# Patient Record
Sex: FEMALE | Race: WHITE | NOT HISPANIC OR LATINO | Employment: UNEMPLOYED | URBAN - METROPOLITAN AREA
[De-identification: names, ages, dates, MRNs, and addresses within clinical notes are randomized per-mention and may not be internally consistent; named-entity substitution may affect disease eponyms.]

---

## 2019-01-19 ENCOUNTER — HOSPITAL ENCOUNTER (OUTPATIENT)
Facility: MEDICAL CENTER | Age: 39
End: 2019-01-19
Attending: OBSTETRICS & GYNECOLOGY | Admitting: OBSTETRICS & GYNECOLOGY
Payer: COMMERCIAL

## 2019-01-19 VITALS
TEMPERATURE: 98.9 F | HEART RATE: 126 BPM | WEIGHT: 240 LBS | OXYGEN SATURATION: 95 % | RESPIRATION RATE: 18 BRPM | DIASTOLIC BLOOD PRESSURE: 60 MMHG | SYSTOLIC BLOOD PRESSURE: 116 MMHG | BODY MASS INDEX: 37.67 KG/M2 | HEIGHT: 67 IN

## 2019-01-19 LAB
ALBUMIN SERPL BCP-MCNC: 3.2 G/DL (ref 3.2–4.9)
ALBUMIN/GLOB SERPL: 1.1 G/DL
ALP SERPL-CCNC: 60 U/L (ref 30–99)
ALT SERPL-CCNC: 15 U/L (ref 2–50)
ANION GAP SERPL CALC-SCNC: 12 MMOL/L (ref 0–11.9)
APPEARANCE UR: CLEAR
AST SERPL-CCNC: 15 U/L (ref 12–45)
BASOPHILS # BLD AUTO: 0.2 % (ref 0–1.8)
BASOPHILS # BLD: 0.03 K/UL (ref 0–0.12)
BILIRUB SERPL-MCNC: 0.4 MG/DL (ref 0.1–1.5)
BILIRUB UR QL STRIP.AUTO: NEGATIVE
BUN SERPL-MCNC: 10 MG/DL (ref 8–22)
CALCIUM SERPL-MCNC: 8.6 MG/DL (ref 8.5–10.5)
CHLORIDE SERPL-SCNC: 106 MMOL/L (ref 96–112)
CO2 SERPL-SCNC: 18 MMOL/L (ref 20–33)
COLOR UR: YELLOW
CREAT SERPL-MCNC: 0.58 MG/DL (ref 0.5–1.4)
EOSINOPHIL # BLD AUTO: 0.01 K/UL (ref 0–0.51)
EOSINOPHIL NFR BLD: 0.1 % (ref 0–6.9)
ERYTHROCYTE [DISTWIDTH] IN BLOOD BY AUTOMATED COUNT: 42.6 FL (ref 35.9–50)
FLUAV RNA SPEC QL NAA+PROBE: NEGATIVE
FLUBV RNA SPEC QL NAA+PROBE: NEGATIVE
GLOBULIN SER CALC-MCNC: 3 G/DL (ref 1.9–3.5)
GLUCOSE SERPL-MCNC: 138 MG/DL (ref 65–99)
GLUCOSE UR STRIP.AUTO-MCNC: NEGATIVE MG/DL
HCT VFR BLD AUTO: 35.8 % (ref 37–47)
HGB BLD-MCNC: 12.4 G/DL (ref 12–16)
IMM GRANULOCYTES # BLD AUTO: 0.46 K/UL (ref 0–0.11)
IMM GRANULOCYTES NFR BLD AUTO: 3.7 % (ref 0–0.9)
KETONES UR STRIP.AUTO-MCNC: >=160 MG/DL
LEUKOCYTE ESTERASE UR QL STRIP.AUTO: NEGATIVE
LIPASE SERPL-CCNC: 37 U/L (ref 11–82)
LYMPHOCYTES # BLD AUTO: 0.76 K/UL (ref 1–4.8)
LYMPHOCYTES NFR BLD: 6.1 % (ref 22–41)
MCH RBC QN AUTO: 29.7 PG (ref 27–33)
MCHC RBC AUTO-ENTMCNC: 34.6 G/DL (ref 33.6–35)
MCV RBC AUTO: 85.9 FL (ref 81.4–97.8)
MICRO URNS: NORMAL
MONOCYTES # BLD AUTO: 0.54 K/UL (ref 0–0.85)
MONOCYTES NFR BLD AUTO: 4.3 % (ref 0–13.4)
NEUTROPHILS # BLD AUTO: 10.76 K/UL (ref 2–7.15)
NEUTROPHILS NFR BLD: 85.6 % (ref 44–72)
NITRITE UR QL STRIP.AUTO: NEGATIVE
NRBC # BLD AUTO: 0 K/UL
NRBC BLD-RTO: 0 /100 WBC
PH UR STRIP.AUTO: 6 [PH]
PLATELET # BLD AUTO: 160 K/UL (ref 164–446)
PMV BLD AUTO: 9 FL (ref 9–12.9)
POTASSIUM SERPL-SCNC: 3.5 MMOL/L (ref 3.6–5.5)
PROT SERPL-MCNC: 6.2 G/DL (ref 6–8.2)
PROT UR QL STRIP: NEGATIVE MG/DL
RBC # BLD AUTO: 4.17 M/UL (ref 4.2–5.4)
RBC UR QL AUTO: NEGATIVE
SODIUM SERPL-SCNC: 136 MMOL/L (ref 135–145)
SP GR UR STRIP.AUTO: 1.02
UROBILINOGEN UR STRIP.AUTO-MCNC: 0.2 MG/DL
WBC # BLD AUTO: 12.6 K/UL (ref 4.8–10.8)

## 2019-01-19 PROCEDURE — 96374 THER/PROPH/DIAG INJ IV PUSH: CPT

## 2019-01-19 PROCEDURE — 81003 URINALYSIS AUTO W/O SCOPE: CPT

## 2019-01-19 PROCEDURE — 59025 FETAL NON-STRESS TEST: CPT

## 2019-01-19 PROCEDURE — 80053 COMPREHEN METABOLIC PANEL: CPT

## 2019-01-19 PROCEDURE — 85025 COMPLETE CBC W/AUTO DIFF WBC: CPT

## 2019-01-19 PROCEDURE — 87502 INFLUENZA DNA AMP PROBE: CPT

## 2019-01-19 PROCEDURE — 700111 HCHG RX REV CODE 636 W/ 250 OVERRIDE (IP): Performed by: OBSTETRICS & GYNECOLOGY

## 2019-01-19 PROCEDURE — 83690 ASSAY OF LIPASE: CPT

## 2019-01-19 PROCEDURE — 700105 HCHG RX REV CODE 258: Performed by: OBSTETRICS & GYNECOLOGY

## 2019-01-19 PROCEDURE — 36415 COLL VENOUS BLD VENIPUNCTURE: CPT

## 2019-01-19 RX ORDER — ONDANSETRON 2 MG/ML
4 INJECTION INTRAMUSCULAR; INTRAVENOUS EVERY 4 HOURS PRN
Status: DISCONTINUED | OUTPATIENT
Start: 2019-01-19 | End: 2019-01-19 | Stop reason: HOSPADM

## 2019-01-19 RX ORDER — SODIUM CHLORIDE, SODIUM LACTATE, POTASSIUM CHLORIDE, CALCIUM CHLORIDE 600; 310; 30; 20 MG/100ML; MG/100ML; MG/100ML; MG/100ML
INJECTION, SOLUTION INTRAVENOUS CONTINUOUS
Status: DISCONTINUED | OUTPATIENT
Start: 2019-01-19 | End: 2019-01-19 | Stop reason: HOSPADM

## 2019-01-19 RX ADMIN — SODIUM CHLORIDE, POTASSIUM CHLORIDE, SODIUM LACTATE AND CALCIUM CHLORIDE 1000 ML: 600; 310; 30; 20 INJECTION, SOLUTION INTRAVENOUS at 17:40

## 2019-01-19 RX ADMIN — ONDANSETRON 4 MG: 2 INJECTION INTRAMUSCULAR; INTRAVENOUS at 18:55

## 2019-01-19 RX ADMIN — SODIUM CHLORIDE, POTASSIUM CHLORIDE, SODIUM LACTATE AND CALCIUM CHLORIDE: 600; 310; 30; 20 INJECTION, SOLUTION INTRAVENOUS at 18:40

## 2019-01-20 NOTE — PROGRESS NOTES
1850 report from DANYA Gardner RN  1956 Dr Martin updated and orders received to discharge home  2007 patient off floor stable on feet

## 2019-01-20 NOTE — PROGRESS NOTES
1705 - Patient of Dr. Moore presents with c/o N/V/D. Turk Gestation - 29.2 weeks    Reports N/V/D since last night. Unable to keep food or sips of fluid down. Reports her son was sick this week with the same symptoms, lasting 24 hours.     Denies denies contractions - cramping off and on just before she has diarrhea, since this afternoon reports constant low abdominal ache. Denies/reports problems with Pregnancy, denies ROM or bleeding. Denies change to vision/edema/HA, Reports FM. FM/TOCO use discussed and placed. FOB at BS. POC discussed. Influenza swab and UA sent to the lab. Patient encouraged to call RN with all questions concerns needs prn.    1720 - Report to Dr. Martin regarding patient arrival/complaint/status. Order for IV hydration until patient passes a PO challenge, PRN zofran, CBC/CMP/Lipase. POC discussed with patient. Patient would like to hold off on the zofran to see how she feels after a bag or two of fluid.     1850 - Report to Yakov LI RN

## 2019-01-20 NOTE — PROGRESS NOTES
NST Review    Karin Briseno is a 39 yo  who presented to [facility] at 29w2d with complaints of nausea and diarrhea for 24 hours. She thought she was febrile at home. She reported abdominal cramping but secondary to peristalsis versus contractions. She denied bleeding or LOF. She reported good FM. She was afebrile but tachycardic on presentation. NST was performed and reviewed by myself with baseline of 150's, moderate variability and accelerations present. Tocometer initially demonstrated irritability that later resolved. CBC demonstrated mild leukocytosis. Chemistry panel and lipase were unremarkable. Urine demonstrated ketones. With IV hydration and IV zofran she reported cessation of her nausea and passed a PO challenge. Ultimately she was stable for discharge home and follow up in clinic with Dr Moore as previously prescribed. She declined prescriptions for discharge. Symptomatic relief measures were reviewed.    Renata Martin M.D.

## 2019-03-05 ENCOUNTER — HOSPITAL ENCOUNTER (OUTPATIENT)
Facility: MEDICAL CENTER | Age: 39
End: 2019-03-05
Attending: OBSTETRICS & GYNECOLOGY | Admitting: OBSTETRICS & GYNECOLOGY
Payer: COMMERCIAL

## 2019-03-05 VITALS
DIASTOLIC BLOOD PRESSURE: 75 MMHG | WEIGHT: 260 LBS | BODY MASS INDEX: 41.78 KG/M2 | SYSTOLIC BLOOD PRESSURE: 127 MMHG | HEART RATE: 100 BPM | HEIGHT: 66 IN | RESPIRATION RATE: 18 BRPM | TEMPERATURE: 97.8 F

## 2019-03-05 LAB
ALBUMIN SERPL BCP-MCNC: 3.2 G/DL (ref 3.2–4.9)
ALBUMIN/GLOB SERPL: 1 G/DL
ALP SERPL-CCNC: 70 U/L (ref 30–99)
ALT SERPL-CCNC: 10 U/L (ref 2–50)
ANION GAP SERPL CALC-SCNC: 11 MMOL/L (ref 0–11.9)
AST SERPL-CCNC: 12 U/L (ref 12–45)
BASOPHILS # BLD AUTO: 0.3 % (ref 0–1.8)
BASOPHILS # BLD: 0.04 K/UL (ref 0–0.12)
BILIRUB SERPL-MCNC: 0.2 MG/DL (ref 0.1–1.5)
BUN SERPL-MCNC: 9 MG/DL (ref 8–22)
CALCIUM SERPL-MCNC: 9.9 MG/DL (ref 8.5–10.5)
CHLORIDE SERPL-SCNC: 105 MMOL/L (ref 96–112)
CO2 SERPL-SCNC: 20 MMOL/L (ref 20–33)
CREAT SERPL-MCNC: 0.59 MG/DL (ref 0.5–1.4)
CREAT UR-MCNC: 125.5 MG/DL
EOSINOPHIL # BLD AUTO: 0.15 K/UL (ref 0–0.51)
EOSINOPHIL NFR BLD: 1.2 % (ref 0–6.9)
ERYTHROCYTE [DISTWIDTH] IN BLOOD BY AUTOMATED COUNT: 46.3 FL (ref 35.9–50)
GLOBULIN SER CALC-MCNC: 3.3 G/DL (ref 1.9–3.5)
GLUCOSE SERPL-MCNC: 104 MG/DL (ref 65–99)
HCT VFR BLD AUTO: 34 % (ref 37–47)
HGB BLD-MCNC: 11.4 G/DL (ref 12–16)
IMM GRANULOCYTES # BLD AUTO: 0.23 K/UL (ref 0–0.11)
IMM GRANULOCYTES NFR BLD AUTO: 1.8 % (ref 0–0.9)
LYMPHOCYTES # BLD AUTO: 2.66 K/UL (ref 1–4.8)
LYMPHOCYTES NFR BLD: 20.7 % (ref 22–41)
MCH RBC QN AUTO: 29.8 PG (ref 27–33)
MCHC RBC AUTO-ENTMCNC: 33.5 G/DL (ref 33.6–35)
MCV RBC AUTO: 89 FL (ref 81.4–97.8)
MONOCYTES # BLD AUTO: 0.89 K/UL (ref 0–0.85)
MONOCYTES NFR BLD AUTO: 6.9 % (ref 0–13.4)
NEUTROPHILS # BLD AUTO: 8.87 K/UL (ref 2–7.15)
NEUTROPHILS NFR BLD: 69.1 % (ref 44–72)
NRBC # BLD AUTO: 0 K/UL
NRBC BLD-RTO: 0 /100 WBC
PLATELET # BLD AUTO: 190 K/UL (ref 164–446)
PMV BLD AUTO: 9.4 FL (ref 9–12.9)
POTASSIUM SERPL-SCNC: 3.3 MMOL/L (ref 3.6–5.5)
PROT SERPL-MCNC: 6.5 G/DL (ref 6–8.2)
PROT UR-MCNC: 22 MG/DL (ref 0–15)
PROT/CREAT UR: 175 MG/G (ref 10–107)
RBC # BLD AUTO: 3.82 M/UL (ref 4.2–5.4)
SODIUM SERPL-SCNC: 136 MMOL/L (ref 135–145)
URATE SERPL-MCNC: 5.8 MG/DL (ref 1.9–8.2)
WBC # BLD AUTO: 12.8 K/UL (ref 4.8–10.8)

## 2019-03-05 PROCEDURE — 59025 FETAL NON-STRESS TEST: CPT

## 2019-03-05 PROCEDURE — 82570 ASSAY OF URINE CREATININE: CPT

## 2019-03-05 PROCEDURE — 84156 ASSAY OF PROTEIN URINE: CPT

## 2019-03-05 PROCEDURE — 84550 ASSAY OF BLOOD/URIC ACID: CPT

## 2019-03-05 PROCEDURE — 80053 COMPREHEN METABOLIC PANEL: CPT

## 2019-03-05 PROCEDURE — 85025 COMPLETE CBC W/AUTO DIFF WBC: CPT

## 2019-03-05 PROCEDURE — 36415 COLL VENOUS BLD VENIPUNCTURE: CPT

## 2019-03-06 NOTE — DISCHARGE INSTRUCTIONS
Preeclampsia and Eclampsia  Preeclampsia is a serious condition that develops only during pregnancy. It is also called toxemia of pregnancy. This condition causes high blood pressure along with other symptoms, such as swelling and headaches. These symptoms may develop as the condition gets worse. Preeclampsia may occur at 20 weeks of pregnancy or later.  Diagnosing and treating preeclampsia early is very important. If not treated early, it can cause serious problems for you and your baby. One problem it can lead to is eclampsia, which is a condition that causes muscle jerking or shaking (convulsions or seizures) in the mother. Delivering your baby is the best treatment for preeclampsia or eclampsia. Preeclampsia and eclampsia symptoms usually go away after your baby is born.  What are the causes?  The cause of preeclampsia is not known.  What increases the risk?  The following risk factors make you more likely to develop preeclampsia:  · Being pregnant for the first time.  · Having had preeclampsia during a past pregnancy.  · Having a family history of preeclampsia.  · Having high blood pressure.  · Being pregnant with twins or triplets.  · Being 35 or older.  · Being -American.  · Having kidney disease or diabetes.  · Having medical conditions such as lupus or blood diseases.  · Being very overweight (obese).  What are the signs or symptoms?  The earliest signs of preeclampsia are:  · High blood pressure.  · Increased protein in your urine. Your health care provider will check for this at every visit before you give birth (prenatal visit).  Other symptoms that may develop as the condition gets worse include:  · Severe headaches.  · Sudden weight gain.  · Swelling of the hands, face, legs, and feet.  · Nausea and vomiting.  · Vision problems, such as blurred or double vision.  · Numbness in the face, arms, legs, and feet.  · Urinating less than usual.  · Dizziness.  · Slurred speech.  · Abdominal pain,  especially upper abdominal pain.  · Convulsions or seizures.  Symptoms generally go away after giving birth.  How is this diagnosed?  There are no screening tests for preeclampsia. Your health care provider will ask you about symptoms and check for signs of preeclampsia during your prenatal visits. You may also have tests that include:  · Urine tests.  · Blood tests.  · Checking your blood pressure.  · Monitoring your baby’s heart rate.  · Ultrasound.  How is this treated?  You and your health care provider will determine the treatment approach that is best for you. Treatment may include:  · Having more frequent prenatal exams to check for signs of preeclampsia, if you have an increased risk for preeclampsia.  · Bed rest.  · Reducing how much salt (sodium) you eat.  · Medicine to lower your blood pressure.  · Staying in the hospital, if your condition is severe. There, treatment will focus on controlling your blood pressure and the amount of fluids in your body (fluid retention).  · You may need to take medicine (magnesium sulfate) to prevent seizures. This medicine may be given as an injection or through an IV tube.  · Delivering your baby early, if your condition gets worse. You may have your labor started with medicine (induced), or you may have a  delivery.  Follow these instructions at home:  Eating and drinking  · Drink enough fluid to keep your urine clear or pale yellow.  · Eat a healthy diet that is low in sodium. Do not add salt to your food. Check nutrition labels to see how much sodium a food or beverage contains.  · Avoid caffeine.  Lifestyle  · Do not use any products that contain nicotine or tobacco, such as cigarettes and e-cigarettes. If you need help quitting, ask your health care provider.  · Do not use alcohol or drugs.  · Avoid stress as much as possible. Rest and get plenty of sleep.  General instructions  · Take over-the-counter and prescription medicines only as told by your health  care provider.  · When lying down, lie on your side. This keeps pressure off of your baby.  · When sitting or lying down, raise (elevate) your feet. Try putting some pillows underneath your lower legs.  · Exercise regularly. Ask your health care provider what kinds of exercise are best for you.  · Keep all follow-up and prenatal visits as told by your health care provider. This is important.  How is this prevented?  To prevent preeclampsia or eclampsia from developing during another pregnancy:  · Get proper medical care during pregnancy. Your health care provider may be able to prevent preeclampsia or diagnose and treat it early.  · Your health care provider may have you take a low-dose aspirin or a calcium supplement during your next pregnancy.  · You may have tests of your blood pressure and kidney function after giving birth.  · Maintain a healthy weight. Ask your health care provider for help managing weight gain during pregnancy.  · Work with your health care provider to manage any long-term (chronic) health conditions you have, such as diabetes or kidney problems.  Contact a health care provider if:  · You gain more weight than expected.  · You have headaches.  · You have nausea or vomiting.  · You have abdominal pain.  · You feel dizzy or light-headed.  Get help right away if:  · You develop sudden or severe swelling anywhere in your body. This usually happens in the legs.  · You gain 5 lbs (2.3 kg) or more during one week.  · You have severe:  ¨ Abdominal pain.  ¨ Headaches.  ¨ Dizziness.  ¨ Vision problems.  ¨ Confusion.  ¨ Nausea or vomiting.  · You have a seizure.  · You have trouble moving any part of your body.  · You develop numbness in any part of your body.  · You have trouble speaking.  · You have any abnormal bleeding.  · You pass out.  This information is not intended to replace advice given to you by your health care provider. Make sure you discuss any questions you have with your health care  provider.  Document Released: 12/15/2001 Document Revised: 08/15/2017 Document Reviewed: 07/24/2017  [employer] Interactive Patient Education © 2017 [employer]  Pre-term Labor (<37 weeks):  Call your physician or return to the hospital if:  · You have painless regular contractions more than 4 in one hour.  · Your water breaks (remember time and color).  · You have menstrual-like cramps, a low dull backache or pressure in your pelvis or back.  · Your baby does not move enough to complete the daily kick count (10 movements in 2 hours).  · Your baby moves much less often than on the days before or you have not felt your baby move all day.  · Please review the MEDICATION LIST section of your AFTER VISIT SUMMARY document.  · Take your medication as prescribed

## 2019-03-06 NOTE — PROGRESS NOTES
165. Pt here from Dr. Moore's office for PIH workup. Pt denies HA, spots in her vision, or epigastric pain. Pt denies uc's, leaking and bleeding. Pt notes +FM. Urine obtained and sent to the lab.    . Report to Dr. Yoon, BP's and lab results given. Orders to dc pt home with  labor and preeclampsia precautions. Pt to keep her regular appointment.    . Pt given dc instructions with preeclampsia precautions. Labor precautions and kick counts. Pt states that she understands and has no questions at this time.

## 2019-03-06 NOTE — H&P
OBSTETRICAL TRIAGE VISIT NOTE    IDENTIFICATION:  This is a 38-year-old  patient who is at 35-5/7th weeks   today, patient of Dr. Moore.  She was sent over from the office for elevated   blood pressures.  The patient denied headaches, visual changes or epigastric   pain.  She denied leaking, bleeding or uterine contractions.  She reported   good fetal movement.  On evaluation here, her blood pressures 142/89, 142/86,   141/89, 137/86 and 127/75.    LABORATORY DATA:  Hemoglobin 11.4, hematocrit 34, platelet count 190,000.  CMP   was normal.  Uric acid was 5.8, spot protein creatinine ratio was 175.  She   had a reactive NST.    ASSESSMENT AND PLAN:  A 38-year-old  at 35 weeks and 5 days sent over   from the office for elevated blood pressures.  Labs and blood pressures here   were consistent with gestational hypertension.  She is asymptomatic.    PLAN:  She will be sent home and will follow up with Dr. Moore later this   week for blood pressure check.  PIH precautions were reviewed with the   patient, and she voiced her understanding when to return here for further   evaluation.       ____________________________________     MD JEFF Chavira / CLARISA    DD:  2019 20:17:22  DT:  2019 21:12:58    D#:  [id number]  Job#:  [id number]

## 2019-03-11 ENCOUNTER — HOSPITAL ENCOUNTER (OUTPATIENT)
Facility: MEDICAL CENTER | Age: 39
End: 2019-03-11
Attending: OBSTETRICS & GYNECOLOGY | Admitting: OBSTETRICS & GYNECOLOGY
Payer: COMMERCIAL

## 2019-03-11 VITALS — DIASTOLIC BLOOD PRESSURE: 78 MMHG | HEART RATE: 100 BPM | SYSTOLIC BLOOD PRESSURE: 145 MMHG

## 2019-03-11 LAB
ALBUMIN SERPL BCP-MCNC: 3.4 G/DL (ref 3.2–4.9)
ALBUMIN/GLOB SERPL: 1.2 G/DL
ALP SERPL-CCNC: 83 U/L (ref 30–99)
ALT SERPL-CCNC: 10 U/L (ref 2–50)
ANION GAP SERPL CALC-SCNC: 11 MMOL/L (ref 0–11.9)
AST SERPL-CCNC: 12 U/L (ref 12–45)
BASOPHILS # BLD AUTO: 0.3 % (ref 0–1.8)
BASOPHILS # BLD: 0.04 K/UL (ref 0–0.12)
BILIRUB SERPL-MCNC: 0.3 MG/DL (ref 0.1–1.5)
BUN SERPL-MCNC: 9 MG/DL (ref 8–22)
CALCIUM SERPL-MCNC: 10.8 MG/DL (ref 8.5–10.5)
CHLORIDE SERPL-SCNC: 103 MMOL/L (ref 96–112)
CO2 SERPL-SCNC: 22 MMOL/L (ref 20–33)
CREAT SERPL-MCNC: 0.62 MG/DL (ref 0.5–1.4)
CREAT UR-MCNC: 82.8 MG/DL
EOSINOPHIL # BLD AUTO: 0.16 K/UL (ref 0–0.51)
EOSINOPHIL NFR BLD: 1.2 % (ref 0–6.9)
ERYTHROCYTE [DISTWIDTH] IN BLOOD BY AUTOMATED COUNT: 47.1 FL (ref 35.9–50)
GLOBULIN SER CALC-MCNC: 2.9 G/DL (ref 1.9–3.5)
GLUCOSE SERPL-MCNC: 87 MG/DL (ref 65–99)
HCT VFR BLD AUTO: 35.3 % (ref 37–47)
HGB BLD-MCNC: 11.6 G/DL (ref 12–16)
IMM GRANULOCYTES # BLD AUTO: 0.16 K/UL (ref 0–0.11)
IMM GRANULOCYTES NFR BLD AUTO: 1.2 % (ref 0–0.9)
LDH SERPL L TO P-CCNC: 145 U/L (ref 107–266)
LYMPHOCYTES # BLD AUTO: 2.7 K/UL (ref 1–4.8)
LYMPHOCYTES NFR BLD: 21 % (ref 22–41)
MCH RBC QN AUTO: 29.7 PG (ref 27–33)
MCHC RBC AUTO-ENTMCNC: 32.9 G/DL (ref 33.6–35)
MCV RBC AUTO: 90.3 FL (ref 81.4–97.8)
MONOCYTES # BLD AUTO: 0.9 K/UL (ref 0–0.85)
MONOCYTES NFR BLD AUTO: 7 % (ref 0–13.4)
NEUTROPHILS # BLD AUTO: 8.92 K/UL (ref 2–7.15)
NEUTROPHILS NFR BLD: 69.3 % (ref 44–72)
NRBC # BLD AUTO: 0 K/UL
NRBC BLD-RTO: 0 /100 WBC
PLATELET # BLD AUTO: 202 K/UL (ref 164–446)
PMV BLD AUTO: 9.4 FL (ref 9–12.9)
POTASSIUM SERPL-SCNC: 3.7 MMOL/L (ref 3.6–5.5)
PROT SERPL-MCNC: 6.3 G/DL (ref 6–8.2)
PROT UR-MCNC: 11.8 MG/DL (ref 0–15)
PROT/CREAT UR: 143 MG/G (ref 10–107)
RBC # BLD AUTO: 3.91 M/UL (ref 4.2–5.4)
SODIUM SERPL-SCNC: 136 MMOL/L (ref 135–145)
URATE SERPL-MCNC: 5.7 MG/DL (ref 1.9–8.2)
WBC # BLD AUTO: 12.9 K/UL (ref 4.8–10.8)

## 2019-03-11 PROCEDURE — 80053 COMPREHEN METABOLIC PANEL: CPT

## 2019-03-11 PROCEDURE — 84156 ASSAY OF PROTEIN URINE: CPT

## 2019-03-11 PROCEDURE — 84550 ASSAY OF BLOOD/URIC ACID: CPT

## 2019-03-11 PROCEDURE — 85025 COMPLETE CBC W/AUTO DIFF WBC: CPT

## 2019-03-11 PROCEDURE — 59025 FETAL NON-STRESS TEST: CPT

## 2019-03-11 PROCEDURE — 82570 ASSAY OF URINE CREATININE: CPT

## 2019-03-11 PROCEDURE — 36415 COLL VENOUS BLD VENIPUNCTURE: CPT

## 2019-03-11 PROCEDURE — 83615 LACTATE (LD) (LDH) ENZYME: CPT

## 2019-03-11 RX ORDER — CITRIC ACID/SODIUM CITRATE 334-500MG
30 SOLUTION, ORAL ORAL EVERY 6 HOURS PRN
Status: CANCELLED | OUTPATIENT
Start: 2019-03-11

## 2019-03-11 RX ORDER — ONDANSETRON 2 MG/ML
4 INJECTION INTRAMUSCULAR; INTRAVENOUS EVERY 6 HOURS PRN
Status: CANCELLED | OUTPATIENT
Start: 2019-03-11

## 2019-03-11 RX ORDER — SODIUM CHLORIDE, SODIUM LACTATE, POTASSIUM CHLORIDE, CALCIUM CHLORIDE 600; 310; 30; 20 MG/100ML; MG/100ML; MG/100ML; MG/100ML
INJECTION, SOLUTION INTRAVENOUS CONTINUOUS
Status: CANCELLED | OUTPATIENT
Start: 2019-03-11 | End: 2019-03-12

## 2019-03-11 RX ORDER — DEXTROSE, SODIUM CHLORIDE, SODIUM LACTATE, POTASSIUM CHLORIDE, AND CALCIUM CHLORIDE 5; .6; .31; .03; .02 G/100ML; G/100ML; G/100ML; G/100ML; G/100ML
INJECTION, SOLUTION INTRAVENOUS CONTINUOUS
Status: CANCELLED | OUTPATIENT
Start: 2019-03-12

## 2019-03-11 RX ORDER — ONDANSETRON 4 MG/1
4 TABLET, ORALLY DISINTEGRATING ORAL EVERY 6 HOURS PRN
Status: CANCELLED | OUTPATIENT
Start: 2019-03-11

## 2019-03-11 NOTE — PROGRESS NOTES
EDC- 4/4, EGA- 36.4    1330- Pt arrived to L&D from Dr. Moore's office for PIH workup.  EFM/TOCO applied, serial BP's started.  Pt denies HA/vision changes/RUQ pain/abnormal swelling, UC's, LOF, or VB.  Reports +FM.  1545- Report to Dr. Pardo.  1600- Orders received to discharge pt home, IOL scheduled for 3/14 at 1000.  Orders for 24 hour urine.  Dr. Pardo at  to discuss discharge instructions, pt verbalizes understanding.  1610- Order received for SVE prior to discharge.  SVE- 2/thick/-2, anterior.  Discharge paper work signed, reviewed HTN and PTL precautions, kick counts reviewed.  Pt verbalizes understanding.  Supplies for 24 hour urine provided.  1615- Pt discharged home ambulatory in stable condition.

## 2019-03-11 NOTE — DISCHARGE INSTRUCTIONS
General Instructions:  · If you think you are in labor, time contractions (lying on your left side) from the beginning of one contraction to the beginning of the next contraction for at least one hour.  · Increase fluid intake: you should consume 10-12 8 oz glasses of non-caffeinated fluid per day.  · Report any pressure or burning on urination to your physician.  · Monitor fetal movement: If you notice an absence or decrease in fetal movement, drink a large glass of water and rest on your side.  If there is no increase in movement, call your physician or go to the hospital for further evaluation.  · Report any sudden, sharp abdominal pain.  · Report any bleeding.  Spotting or pinkish discharge is normal after vaginal exam.  You may also spot after sexual intercourse.    High Blood Pressure:  · Rest on your right or left side.  · Report any severe headaches, dizziness, blurred vision or spots before your eyes.  · Report excessive swelling of your hands, face or feet.  · Report epigastric pain (upper abdominal pain)      Other Instructions:  Please carefully review your entire AFTER VISIT SUMMARY document for all discharge instructions.

## 2019-03-12 ENCOUNTER — HOSPITAL ENCOUNTER (OUTPATIENT)
Facility: MEDICAL CENTER | Age: 39
End: 2019-03-12
Attending: OBSTETRICS & GYNECOLOGY
Payer: COMMERCIAL

## 2019-03-12 PROCEDURE — 84156 ASSAY OF PROTEIN URINE: CPT

## 2019-03-12 PROCEDURE — 81050 URINALYSIS VOLUME MEASURE: CPT

## 2019-03-12 NOTE — PROGRESS NOTES
Department of Obstetrics and Gynecology  Labor and Delivery Assessment Note    ID: 38 y.o. is a  with IUP at 36w4d     Primary Obstetrician: Queta Moore MD    Assessing Obstetrician: Joseph Pardo MD    CC: Sent from the office for elevated BP    HPI: Pt feeling well.  Was at her normally scheduled visit today.   The patient denies HA, visual changes, RUQ/epigastric pain.  Endorses good fetal movement.  Denies CTX, LOF, VB.  No other c/o.    ROS: 10 systems negative except as noted above.    O: Patient Vitals for the past 24 hrs:   BP Pulse   19 1555 145/78 100   19 1540 141/73 (!) 108   19 1522 138/79 96   19 1512 122/71 (!) 107   19 1502 144/74 97   19 1452 144/75 (!) 105   19 1443 137/71 100   19 1432 159/92 97   19 1422 153/90 96   19 1412 141/85 (!) 114   19 1403 139/79 99   19 1352 142/83 100   19 1342 133/83 (!) 114     Gen: NAD, AAO  Pulm: no distress  Abd: Gravid, soft, uterus NTTP, no rebound/guarding  Ext: WWP, 2+ DPP, 1+ edema  SVE: 2/thick/-2 per RN exam    FHT: 140/mod gurjit/+accels, -decels  [place]: irritability    CBC:   3/11/2019 14:14   WBC 12.9 (H)   RBC 3.91 (L)   Hemoglobin 11.6 (L)   Hematocrit 35.3 (L)   MCV 90.3   MCH 29.7   MCHC 32.9 (L)   RDW 47.1   Platelet Count 202     [place]:   3/11/2019 14:14   Sodium 136   Potassium 3.7   Chloride 103   Co2 22   Anion Gap 11.0   Glucose 87   Bun 9   Creatinine 0.62   GFR If  >60   GFR If Non African American >60   Calcium 10.8 (H)   AST(SGOT) 12   ALT(SGPT) 10   Alkaline Phosphatase 83   Total Bilirubin 0.3   Albumin 3.4   Total Protein 6.3   Globulin 2.9   A-G Ratio 1.2   Uric Acid 5.7   LDH Total 145      3/11/2019 14:03   Protein Creatinine Ratio 143 (H)       A/P: Karin Briseno is a 38 y.o.  at 36w4d.  Mild range HTN.  Reassuring, reactive NST read by me.  *Elevated BP: likely c/w GHTN.  No evidence for HELLP or PreE.  No sx.  Will set up  for IOL in 3d on Thurs with Dr. Moore.  Complete 24h UProt.    OK for outpt mgmt.  PreE/term precautions given.      Joseph Pardo M.D.

## 2019-03-13 LAB
PROT 24H UR-MCNC: 270 MG/24 HR (ref 30–150)
PROT 24H UR-MRATE: 10 MG/DL (ref 0–15)
SPECIMEN VOL UR: 2700 ML

## 2019-03-14 ENCOUNTER — ANESTHESIA (OUTPATIENT)
Dept: OBGYN | Facility: MEDICAL CENTER | Age: 39
End: 2019-03-14
Payer: COMMERCIAL

## 2019-03-14 ENCOUNTER — ANESTHESIA EVENT (OUTPATIENT)
Dept: OBGYN | Facility: MEDICAL CENTER | Age: 39
End: 2019-03-14
Payer: COMMERCIAL

## 2019-03-14 ENCOUNTER — HOSPITAL ENCOUNTER (INPATIENT)
Facility: MEDICAL CENTER | Age: 39
LOS: 2 days | End: 2019-03-16
Attending: OBSTETRICS & GYNECOLOGY | Admitting: OBSTETRICS & GYNECOLOGY
Payer: COMMERCIAL

## 2019-03-14 ENCOUNTER — APPOINTMENT (OUTPATIENT)
Dept: OBGYN | Facility: MEDICAL CENTER | Age: 39
End: 2019-03-14
Attending: OBSTETRICS & GYNECOLOGY
Payer: COMMERCIAL

## 2019-03-14 DIAGNOSIS — R52 PAIN RELATED TO VAGINAL DELIVERY: ICD-10-CM

## 2019-03-14 LAB
ALBUMIN SERPL BCP-MCNC: 3.3 G/DL (ref 3.2–4.9)
ALBUMIN/GLOB SERPL: 1 G/DL
ALP SERPL-CCNC: 84 U/L (ref 30–99)
ALT SERPL-CCNC: 10 U/L (ref 2–50)
ANION GAP SERPL CALC-SCNC: 11 MMOL/L (ref 0–11.9)
AST SERPL-CCNC: 10 U/L (ref 12–45)
BASOPHILS # BLD AUTO: 0.2 % (ref 0–1.8)
BASOPHILS # BLD: 0.02 K/UL (ref 0–0.12)
BILIRUB SERPL-MCNC: 0.3 MG/DL (ref 0.1–1.5)
BUN SERPL-MCNC: 10 MG/DL (ref 8–22)
CALCIUM SERPL-MCNC: 9.9 MG/DL (ref 8.5–10.5)
CHLORIDE SERPL-SCNC: 104 MMOL/L (ref 96–112)
CO2 SERPL-SCNC: 20 MMOL/L (ref 20–33)
CREAT SERPL-MCNC: 0.7 MG/DL (ref 0.5–1.4)
EOSINOPHIL # BLD AUTO: 0.13 K/UL (ref 0–0.51)
EOSINOPHIL NFR BLD: 1.1 % (ref 0–6.9)
ERYTHROCYTE [DISTWIDTH] IN BLOOD BY AUTOMATED COUNT: 46.2 FL (ref 35.9–50)
GLOBULIN SER CALC-MCNC: 3.3 G/DL (ref 1.9–3.5)
GLUCOSE SERPL-MCNC: 164 MG/DL (ref 65–99)
HCT VFR BLD AUTO: 34.7 % (ref 37–47)
HGB BLD-MCNC: 11.8 G/DL (ref 12–16)
HOLDING TUBE BB 8507: NORMAL
IMM GRANULOCYTES # BLD AUTO: 0.16 K/UL (ref 0–0.11)
IMM GRANULOCYTES NFR BLD AUTO: 1.3 % (ref 0–0.9)
LYMPHOCYTES # BLD AUTO: 2.64 K/UL (ref 1–4.8)
LYMPHOCYTES NFR BLD: 21.6 % (ref 22–41)
MCH RBC QN AUTO: 29.9 PG (ref 27–33)
MCHC RBC AUTO-ENTMCNC: 34 G/DL (ref 33.6–35)
MCV RBC AUTO: 87.8 FL (ref 81.4–97.8)
MONOCYTES # BLD AUTO: 0.62 K/UL (ref 0–0.85)
MONOCYTES NFR BLD AUTO: 5.1 % (ref 0–13.4)
NEUTROPHILS # BLD AUTO: 8.65 K/UL (ref 2–7.15)
NEUTROPHILS NFR BLD: 70.7 % (ref 44–72)
NRBC # BLD AUTO: 0 K/UL
NRBC BLD-RTO: 0 /100 WBC
PLATELET # BLD AUTO: 200 K/UL (ref 164–446)
PMV BLD AUTO: 9.8 FL (ref 9–12.9)
POTASSIUM SERPL-SCNC: 3.7 MMOL/L (ref 3.6–5.5)
PROT SERPL-MCNC: 6.6 G/DL (ref 6–8.2)
RBC # BLD AUTO: 3.95 M/UL (ref 4.2–5.4)
SODIUM SERPL-SCNC: 135 MMOL/L (ref 135–145)
URATE SERPL-MCNC: 5.5 MG/DL (ref 1.9–8.2)
WBC # BLD AUTO: 12.2 K/UL (ref 4.8–10.8)

## 2019-03-14 PROCEDURE — 700105 HCHG RX REV CODE 258: Performed by: OBSTETRICS & GYNECOLOGY

## 2019-03-14 PROCEDURE — 700111 HCHG RX REV CODE 636 W/ 250 OVERRIDE (IP): Performed by: OBSTETRICS & GYNECOLOGY

## 2019-03-14 PROCEDURE — 59409 OBSTETRICAL CARE: CPT

## 2019-03-14 PROCEDURE — 700111 HCHG RX REV CODE 636 W/ 250 OVERRIDE (IP)

## 2019-03-14 PROCEDURE — 304965 HCHG RECOVERY SERVICES

## 2019-03-14 PROCEDURE — 36415 COLL VENOUS BLD VENIPUNCTURE: CPT

## 2019-03-14 PROCEDURE — 0HQ9XZZ REPAIR PERINEUM SKIN, EXTERNAL APPROACH: ICD-10-PCS | Performed by: OBSTETRICS & GYNECOLOGY

## 2019-03-14 PROCEDURE — 80053 COMPREHEN METABOLIC PANEL: CPT

## 2019-03-14 PROCEDURE — 84550 ASSAY OF BLOOD/URIC ACID: CPT

## 2019-03-14 PROCEDURE — 700101 HCHG RX REV CODE 250: Performed by: ANESTHESIOLOGY

## 2019-03-14 PROCEDURE — 700102 HCHG RX REV CODE 250 W/ 637 OVERRIDE(OP): Performed by: OBSTETRICS & GYNECOLOGY

## 2019-03-14 PROCEDURE — 700111 HCHG RX REV CODE 636 W/ 250 OVERRIDE (IP): Performed by: ANESTHESIOLOGY

## 2019-03-14 PROCEDURE — 770002 HCHG ROOM/CARE - OB PRIVATE (112)

## 2019-03-14 PROCEDURE — 85025 COMPLETE CBC W/AUTO DIFF WBC: CPT

## 2019-03-14 PROCEDURE — A9270 NON-COVERED ITEM OR SERVICE: HCPCS | Performed by: OBSTETRICS & GYNECOLOGY

## 2019-03-14 RX ORDER — ONDANSETRON 2 MG/ML
4 INJECTION INTRAMUSCULAR; INTRAVENOUS EVERY 6 HOURS PRN
Status: DISCONTINUED | OUTPATIENT
Start: 2019-03-14 | End: 2019-03-16 | Stop reason: HOSPADM

## 2019-03-14 RX ORDER — LABETALOL HYDROCHLORIDE 5 MG/ML
20-40 INJECTION, SOLUTION INTRAVENOUS PRN
Status: DISCONTINUED | OUTPATIENT
Start: 2019-03-14 | End: 2019-03-15

## 2019-03-14 RX ORDER — SODIUM CHLORIDE, SODIUM LACTATE, POTASSIUM CHLORIDE, AND CALCIUM CHLORIDE .6; .31; .03; .02 G/100ML; G/100ML; G/100ML; G/100ML
250 INJECTION, SOLUTION INTRAVENOUS PRN
Status: CANCELLED | OUTPATIENT
Start: 2019-03-14

## 2019-03-14 RX ORDER — ROPIVACAINE HYDROCHLORIDE 2 MG/ML
INJECTION, SOLUTION EPIDURAL; INFILTRATION PRN
Status: DISCONTINUED | OUTPATIENT
Start: 2019-03-14 | End: 2019-03-14 | Stop reason: SURG

## 2019-03-14 RX ORDER — DOCUSATE SODIUM 100 MG/1
100 CAPSULE, LIQUID FILLED ORAL 2 TIMES DAILY
COMMUNITY
End: 2021-01-14

## 2019-03-14 RX ORDER — OXYCODONE AND ACETAMINOPHEN 10; 325 MG/1; MG/1
1 TABLET ORAL EVERY 4 HOURS PRN
Status: DISCONTINUED | OUTPATIENT
Start: 2019-03-14 | End: 2019-03-16 | Stop reason: HOSPADM

## 2019-03-14 RX ORDER — OXYCODONE HYDROCHLORIDE AND ACETAMINOPHEN 5; 325 MG/1; MG/1
1 TABLET ORAL EVERY 4 HOURS PRN
Status: DISCONTINUED | OUTPATIENT
Start: 2019-03-14 | End: 2019-03-16 | Stop reason: HOSPADM

## 2019-03-14 RX ORDER — ROPIVACAINE HYDROCHLORIDE 2 MG/ML
INJECTION, SOLUTION EPIDURAL; INFILTRATION; PERINEURAL CONTINUOUS
Status: CANCELLED | OUTPATIENT
Start: 2019-03-14

## 2019-03-14 RX ORDER — SODIUM CHLORIDE, SODIUM LACTATE, POTASSIUM CHLORIDE, CALCIUM CHLORIDE 600; 310; 30; 20 MG/100ML; MG/100ML; MG/100ML; MG/100ML
INJECTION, SOLUTION INTRAVENOUS CONTINUOUS
Status: DISPENSED | OUTPATIENT
Start: 2019-03-14 | End: 2019-03-14

## 2019-03-14 RX ORDER — SODIUM CHLORIDE, SODIUM LACTATE, POTASSIUM CHLORIDE, AND CALCIUM CHLORIDE .6; .31; .03; .02 G/100ML; G/100ML; G/100ML; G/100ML
1000 INJECTION, SOLUTION INTRAVENOUS
Status: CANCELLED | OUTPATIENT
Start: 2019-03-14

## 2019-03-14 RX ORDER — ROPIVACAINE HYDROCHLORIDE 2 MG/ML
INJECTION, SOLUTION EPIDURAL; INFILTRATION; PERINEURAL
Status: COMPLETED
Start: 2019-03-14 | End: 2019-03-14

## 2019-03-14 RX ORDER — CALCIUM CARBONATE 500 MG/1
500 TABLET, CHEWABLE ORAL EVERY 4 HOURS PRN
Status: DISCONTINUED | OUTPATIENT
Start: 2019-03-14 | End: 2019-03-16 | Stop reason: HOSPADM

## 2019-03-14 RX ORDER — IBUPROFEN 600 MG/1
600 TABLET ORAL EVERY 6 HOURS PRN
Status: DISCONTINUED | OUTPATIENT
Start: 2019-03-14 | End: 2019-03-16 | Stop reason: HOSPADM

## 2019-03-14 RX ORDER — LIDOCAINE HYDROCHLORIDE AND EPINEPHRINE 15; 5 MG/ML; UG/ML
INJECTION, SOLUTION EPIDURAL PRN
Status: DISCONTINUED | OUTPATIENT
Start: 2019-03-14 | End: 2019-03-14 | Stop reason: SURG

## 2019-03-14 RX ORDER — HYDRALAZINE HYDROCHLORIDE 20 MG/ML
5-10 INJECTION INTRAMUSCULAR; INTRAVENOUS PRN
Status: DISCONTINUED | OUTPATIENT
Start: 2019-03-14 | End: 2019-03-15

## 2019-03-14 RX ORDER — ONDANSETRON 4 MG/1
4 TABLET, ORALLY DISINTEGRATING ORAL EVERY 6 HOURS PRN
Status: DISCONTINUED | OUTPATIENT
Start: 2019-03-14 | End: 2019-03-16 | Stop reason: HOSPADM

## 2019-03-14 RX ORDER — MISOPROSTOL 200 UG/1
800 TABLET ORAL
Status: DISCONTINUED | OUTPATIENT
Start: 2019-03-14 | End: 2019-03-15 | Stop reason: HOSPADM

## 2019-03-14 RX ADMIN — IBUPROFEN 600 MG: 600 TABLET ORAL at 21:59

## 2019-03-14 RX ADMIN — ANTACID TABLETS 500 MG: 500 TABLET, CHEWABLE ORAL at 15:57

## 2019-03-14 RX ADMIN — ROPIVACAINE HYDROCHLORIDE 5 ML: 2 INJECTION, SOLUTION EPIDURAL; INFILTRATION at 18:03

## 2019-03-14 RX ADMIN — ROPIVACAINE HYDROCHLORIDE 6 ML: 2 INJECTION, SOLUTION EPIDURAL; INFILTRATION at 18:00

## 2019-03-14 RX ADMIN — Medication 125 ML/HR: at 21:30

## 2019-03-14 RX ADMIN — FENTANYL CITRATE 100 MCG: 50 INJECTION, SOLUTION INTRAMUSCULAR; INTRAVENOUS at 16:23

## 2019-03-14 RX ADMIN — LIDOCAINE HYDROCHLORIDE,EPINEPHRINE BITARTRATE 3 ML: 15; .005 INJECTION, SOLUTION EPIDURAL; INFILTRATION; INTRACAUDAL; PERINEURAL at 17:16

## 2019-03-14 RX ADMIN — SODIUM CHLORIDE, POTASSIUM CHLORIDE, SODIUM LACTATE AND CALCIUM CHLORIDE: 600; 310; 30; 20 INJECTION, SOLUTION INTRAVENOUS at 17:07

## 2019-03-14 RX ADMIN — HYDRALAZINE HYDROCHLORIDE 5 MG: 20 INJECTION INTRAMUSCULAR; INTRAVENOUS at 20:01

## 2019-03-14 RX ADMIN — Medication 2000 ML/HR: at 20:30

## 2019-03-14 RX ADMIN — HYDRALAZINE HYDROCHLORIDE 5 MG: 20 INJECTION INTRAMUSCULAR; INTRAVENOUS at 14:11

## 2019-03-14 RX ADMIN — OXYCODONE AND ACETAMINOPHEN 1 TABLET: 10; 325 TABLET ORAL at 21:59

## 2019-03-14 RX ADMIN — Medication 2 MILLI-UNITS/MIN: at 11:30

## 2019-03-14 RX ADMIN — ROPIVACAINE HYDROCHLORIDE 100 ML: 2 INJECTION, SOLUTION EPIDURAL; INFILTRATION at 17:08

## 2019-03-14 RX ADMIN — SODIUM CHLORIDE, POTASSIUM CHLORIDE, SODIUM LACTATE AND CALCIUM CHLORIDE: 600; 310; 30; 20 INJECTION, SOLUTION INTRAVENOUS at 11:29

## 2019-03-14 RX ADMIN — ANTACID TABLETS 500 MG: 500 TABLET, CHEWABLE ORAL at 12:00

## 2019-03-14 RX ADMIN — HYDRALAZINE HYDROCHLORIDE 10 MG: 20 INJECTION INTRAMUSCULAR; INTRAVENOUS at 20:15

## 2019-03-14 RX ADMIN — LIDOCAINE HYDROCHLORIDE,EPINEPHRINE BITARTRATE 2 ML: 15; .005 INJECTION, SOLUTION EPIDURAL; INFILTRATION; INTRACAUDAL; PERINEURAL at 17:20

## 2019-03-14 ASSESSMENT — PATIENT HEALTH QUESTIONNAIRE - PHQ9
2. FEELING DOWN, DEPRESSED, IRRITABLE, OR HOPELESS: NOT AT ALL
SUM OF ALL RESPONSES TO PHQ9 QUESTIONS 1 AND 2: 0
1. LITTLE INTEREST OR PLEASURE IN DOING THINGS: NOT AT ALL

## 2019-03-14 ASSESSMENT — LIFESTYLE VARIABLES
ALCOHOL_USE: NO
EVER_SMOKED: NEVER

## 2019-03-14 ASSESSMENT — COPD QUESTIONNAIRES
HAVE YOU SMOKED AT LEAST 100 CIGARETTES IN YOUR ENTIRE LIFE: NO/DON'T KNOW
DO YOU EVER COUGH UP ANY MUCUS OR PHLEGM?: NO/ONLY WITH OCCASIONAL COLDS OR INFECTIONS
IN THE PAST 12 MONTHS DO YOU DO LESS THAN YOU USED TO BECAUSE OF YOUR BREATHING PROBLEMS: DISAGREE/UNSURE
DURING THE PAST 4 WEEKS HOW MUCH DID YOU FEEL SHORT OF BREATH: NONE/LITTLE OF THE TIME
COPD SCREENING SCORE: 0

## 2019-03-14 NOTE — H&P
History and physical     March 14, 2019    Chief complaint:   ` Patient presents for induction for gestational hypertension    History of present illness: Patient is a 38-year-old female para 2 whose estimated date of confinement is April 4 based on her last menstrual period consistent with an 8-week ultrasound.  Patient has been followed for high blood pressure.  This developed last couple weeks and she presents for induction for gestational hypertension.    Medical history: History of hyperthyroidism that has resolved    Surgical history: None    Habits: Patient denies tobacco alcohol or drug use    Allergies: No known drug allergies    Medications: None    Obstetric history: Patient is Rh+, rubella immune, group B strep negative.  2 previous uncomplicated vaginal deliveries    Family history: NoncontributoryTo current problem    Physical exam  Vitals: 142/83  General: Patient is awake alert pleasant  Head: Atraumatic normocephalic  Abdomen gravid: Estimated fetal weight is 7/2-8 pounds  Pelvic: Cervix is 3 cm / 50% effaced/versus vertex.  Amniotic sac was ruptured fluid was clear  Extremities: Normal reflexes are 1-2+    Assessment:  1-37-week intrauterine pregnancy  2-gestational hypertension      Plan:  We recommended delivery because of gestational hypertension to prevent development of preeclampsia and severe preeclampsia.  Risk complications of conservative management versus induction have been discussed.

## 2019-03-14 NOTE — PROGRESS NOTES
EDC - 19 EGA - 37.0    1021 - Pt arrived to labor and delivery for IOL for gestational HTN. Pt placed in room 213. External monitors in place X2. Category I FHT at this time.  Pt reports good FM. No complaints of contractions, ROM or vaginal bleeding. Pt denies HA epigastric pain, blurry vision and swelling. FOB at bedside. POC discussed with pt and family members, all questions answered.   1035 IV started with 1 attempt, labs drawn.  1056 Admission procedures completed at this time.  1253 Dr. Moore at bedside, SVE 3/thick/-2 AROM clear fluid at this time, FSE placed.  1540 Dr. Moore at bedside, POC discussed, pt states she is feeling her UCs stronger at this time, denies need for pain management.  1557 SVE 4-5/70/-2 Heat packs given  1623 Fentanyl given at this time.  1705 - Dr. Morton at bedside. Time out called at 1705. Epidural placed at this time by Dr Morton. Test dose at 1715 - No reaction detected - VSS. Dermatome level of T8. Epidural infusion settings are : 12mL/hr continuous infusion, 5mL bolus every 15 minutes with a 30mL/hr dose limit.   1749 - Flynn placed and draining to gravity. SVE 5-6/100/-1. Turned to left side. Category 1 FHT tracing at this time. No complaints at this time.   1757 Dr. Morton at bedside, bolus given.  1830 Pt states she is comfortable at this time.   SVE Antlip/+1 Pt able to push past lip. Dr. Moore notified, orders to start pushing at this time.    Pt repositioned into stirrups, pt educated on proper pushing technique. RN continuously at bedside evaluating FHT and pushing progress.   Dr. Moore at bedside for delivery.    of viable female infant by Dr. Moore to maternal abdomen. Apgars 8/9     Placenta delivered intact.     Fundus firm, lochia Light   Report given to RODNEY Dukes RN

## 2019-03-14 NOTE — CARE PLAN
Problem: Pain  Goal: Alleviation of Pain or a reduction in pain to the patient's comfort goal  Outcome: PROGRESSING AS EXPECTED  Pain management options discussed, pt denies needs at this time.    Problem: Risk for Infection, Impaired Wound Healing  Goal: Remain free from signs and symptoms of infection  Outcome: PROGRESSING AS EXPECTED  No s/s of infection noted, pt remains afebrile

## 2019-03-15 LAB
EKG IMPRESSION: NORMAL
ERYTHROCYTE [DISTWIDTH] IN BLOOD BY AUTOMATED COUNT: 47.7 FL (ref 35.9–50)
HCT VFR BLD AUTO: 32.3 % (ref 37–47)
HGB BLD-MCNC: 10.5 G/DL (ref 12–16)
MCH RBC QN AUTO: 29.2 PG (ref 27–33)
MCHC RBC AUTO-ENTMCNC: 32.5 G/DL (ref 33.6–35)
MCV RBC AUTO: 89.7 FL (ref 81.4–97.8)
PLATELET # BLD AUTO: 166 K/UL (ref 164–446)
PMV BLD AUTO: 9.4 FL (ref 9–12.9)
RBC # BLD AUTO: 3.6 M/UL (ref 4.2–5.4)
TSH SERPL DL<=0.005 MIU/L-ACNC: 1.82 UIU/ML (ref 0.38–5.33)
WBC # BLD AUTO: 15 K/UL (ref 4.8–10.8)

## 2019-03-15 PROCEDURE — 303615 HCHG EPIDURAL/SPINAL ANESTHESIA FOR LABOR

## 2019-03-15 PROCEDURE — 93005 ELECTROCARDIOGRAM TRACING: CPT | Performed by: OBSTETRICS & GYNECOLOGY

## 2019-03-15 PROCEDURE — A9270 NON-COVERED ITEM OR SERVICE: HCPCS | Performed by: OBSTETRICS & GYNECOLOGY

## 2019-03-15 PROCEDURE — 770002 HCHG ROOM/CARE - OB PRIVATE (112)

## 2019-03-15 PROCEDURE — 700102 HCHG RX REV CODE 250 W/ 637 OVERRIDE(OP): Performed by: OBSTETRICS & GYNECOLOGY

## 2019-03-15 PROCEDURE — 36415 COLL VENOUS BLD VENIPUNCTURE: CPT

## 2019-03-15 PROCEDURE — 84443 ASSAY THYROID STIM HORMONE: CPT

## 2019-03-15 PROCEDURE — 93010 ELECTROCARDIOGRAM REPORT: CPT | Performed by: INTERNAL MEDICINE

## 2019-03-15 PROCEDURE — 85027 COMPLETE CBC AUTOMATED: CPT

## 2019-03-15 RX ORDER — MISOPROSTOL 200 UG/1
600 TABLET ORAL
Status: DISCONTINUED | OUTPATIENT
Start: 2019-03-15 | End: 2019-03-16 | Stop reason: HOSPADM

## 2019-03-15 RX ORDER — VITAMIN A ACETATE, BETA CAROTENE, ASCORBIC ACID, CHOLECALCIFEROL, .ALPHA.-TOCOPHEROL ACETATE, DL-, THIAMINE MONONITRATE, RIBOFLAVIN, NIACINAMIDE, PYRIDOXINE HYDROCHLORIDE, FOLIC ACID, CYANOCOBALAMIN, CALCIUM CARBONATE, FERROUS FUMARATE, ZINC OXIDE, CUPRIC OXIDE 3080; 12; 120; 400; 1; 1.84; 3; 20; 22; 920; 25; 200; 27; 10; 2 [IU]/1; UG/1; MG/1; [IU]/1; MG/1; MG/1; MG/1; MG/1; MG/1; [IU]/1; MG/1; MG/1; MG/1; MG/1; MG/1
1 TABLET, FILM COATED ORAL EVERY MORNING
Status: DISCONTINUED | OUTPATIENT
Start: 2019-03-15 | End: 2019-03-16 | Stop reason: HOSPADM

## 2019-03-15 RX ORDER — MISOPROSTOL 200 UG/1
800 TABLET ORAL
Status: DISCONTINUED | OUTPATIENT
Start: 2019-03-15 | End: 2019-03-16 | Stop reason: HOSPADM

## 2019-03-15 RX ORDER — IBUPROFEN 600 MG/1
600 TABLET ORAL EVERY 6 HOURS PRN
Status: DISCONTINUED | OUTPATIENT
Start: 2019-03-15 | End: 2019-03-15

## 2019-03-15 RX ORDER — ACETAMINOPHEN 325 MG/1
325 TABLET ORAL EVERY 4 HOURS PRN
Status: DISCONTINUED | OUTPATIENT
Start: 2019-03-15 | End: 2019-03-16 | Stop reason: HOSPADM

## 2019-03-15 RX ORDER — CARBOPROST TROMETHAMINE 250 UG/ML
250 INJECTION, SOLUTION INTRAMUSCULAR
Status: DISCONTINUED | OUTPATIENT
Start: 2019-03-15 | End: 2019-03-16 | Stop reason: HOSPADM

## 2019-03-15 RX ORDER — DOCUSATE SODIUM 100 MG/1
100 CAPSULE, LIQUID FILLED ORAL 2 TIMES DAILY PRN
Status: DISCONTINUED | OUTPATIENT
Start: 2019-03-15 | End: 2019-03-16 | Stop reason: HOSPADM

## 2019-03-15 RX ORDER — HYDROCODONE BITARTRATE AND ACETAMINOPHEN 10; 325 MG/1; MG/1
1 TABLET ORAL EVERY 4 HOURS PRN
Status: DISCONTINUED | OUTPATIENT
Start: 2019-03-15 | End: 2019-03-15

## 2019-03-15 RX ORDER — HYDROCODONE BITARTRATE AND ACETAMINOPHEN 5; 325 MG/1; MG/1
1 TABLET ORAL EVERY 4 HOURS PRN
Status: DISCONTINUED | OUTPATIENT
Start: 2019-03-15 | End: 2019-03-15

## 2019-03-15 RX ORDER — SODIUM CHLORIDE, SODIUM LACTATE, POTASSIUM CHLORIDE, CALCIUM CHLORIDE 600; 310; 30; 20 MG/100ML; MG/100ML; MG/100ML; MG/100ML
INJECTION, SOLUTION INTRAVENOUS PRN
Status: DISCONTINUED | OUTPATIENT
Start: 2019-03-15 | End: 2019-03-16 | Stop reason: HOSPADM

## 2019-03-15 RX ADMIN — IBUPROFEN 600 MG: 600 TABLET ORAL at 05:36

## 2019-03-15 RX ADMIN — IBUPROFEN 600 MG: 600 TABLET ORAL at 12:28

## 2019-03-15 RX ADMIN — Medication 1 TABLET: at 08:26

## 2019-03-15 RX ADMIN — OXYCODONE HYDROCHLORIDE AND ACETAMINOPHEN 1 TABLET: 5; 325 TABLET ORAL at 22:22

## 2019-03-15 ASSESSMENT — EDINBURGH POSTNATAL DEPRESSION SCALE (EPDS)
I HAVE FELT SAD OR MISERABLE: NO, NOT AT ALL
I HAVE BEEN ABLE TO LAUGH AND SEE THE FUNNY SIDE OF THINGS: AS MUCH AS I ALWAYS COULD
I HAVE BEEN ANXIOUS OR WORRIED FOR NO GOOD REASON: NO, NOT AT ALL
I HAVE BEEN SO UNHAPPY THAT I HAVE HAD DIFFICULTY SLEEPING: NOT VERY OFTEN
THE THOUGHT OF HARMING MYSELF HAS OCCURRED TO ME: NEVER
THINGS HAVE BEEN GETTING ON TOP OF ME: NO, MOST OF THE TIME I HAVE COPED QUITE WELL
I HAVE BLAMED MYSELF UNNECESSARILY WHEN THINGS WENT WRONG: YES, SOME OF THE TIME
I HAVE FELT SCARED OR PANICKY FOR NO GOOD REASON: NO, NOT AT ALL
I HAVE BEEN SO UNHAPPY THAT I HAVE BEEN CRYING: ONLY OCCASIONALLY
I HAVE LOOKED FORWARD WITH ENJOYMENT TO THINGS: AS MUCH AS I EVER DID

## 2019-03-15 NOTE — PROGRESS NOTES
1440- Dr. Martin notified of patient's elevated heart rate this shift.  Patient denied palpitations, chest pain, and shortness of breath.  Patient denied pain.  Telephone order received for TSH and EKG.  6989- Dr. Martin notified of patient's TSH and EKG results and vital signs done at 1400.  No new orders at this time.

## 2019-03-15 NOTE — L&D DELIVERY NOTE
Delivery note    2019    Delivering physician: Queta Moore MD      Diagnoses:   1-Ter m intrauterine pregnancy  2-delivery viable female Apgars 8/9  3-gestational hypertension    Procedure:    1-induction with vaginal delivery    Hospital course:    Patient is a 38-year-old female  3 para 2 who presented at 37 weeks for induction for gestational hypertension.  She was begun on Pitocin followed by rupture of amniotic sac.  Once in active labor she received an epidural and progressed to complete.  Patient pushed about 10-15 minutes.  Baby delivered an uncomplicated manner.  Baby was vigorous moving all extremities.  Cord was clamped and cut.  Baby was handed off to mom with nurse present.  Apgars were 8/9.  Placenta delivered spontaneously and intact.  Exam of uterus revealed no retained placenta.    Estimated blood loss was 150 cc.    First degree tear was repaired with 3-0 Vicryl suture.

## 2019-03-15 NOTE — ANESTHESIA PREPROCEDURE EVALUATION
Labor pain    Relevant Problems   No relevant active problems       Physical Exam    Airway   Mallampati: II  TM distance: >3 FB  Neck ROM: full       Cardiovascular - normal exam  Rhythm: regular  Rate: normal  (-) murmur     Dental - normal exam         Pulmonary - normal exam  Breath sounds clear to auscultation     Abdominal    Neurological - normal exam                 Anesthesia Plan    ASA 2       Plan - epidural   Neuraxial block will be labor analgesia              Pertinent diagnostic labs and testing reviewed    Informed Consent:    Anesthetic plan and risks discussed with patient.

## 2019-03-15 NOTE — PROGRESS NOTES
Patient admitted to room 331. Bedside report received from RODNEY Dukes RN. Infant bands verified. Assessment complete. Plan of care discussed. Patient will request pain medication as needed. Patient oriented to room and educated on call light and emergency light. Baby's clipboard reviewed and instructions given for filling it out. Call light within reach. Will continue to monitor.

## 2019-03-15 NOTE — CARE PLAN
Problem: Communication  Goal: The ability to communicate needs accurately and effectively will improve  Outcome: PROGRESSING AS EXPECTED  Reviewed plan of care with patient who verbalized understanding.    Problem: Altered physiologic condition related to immediate post-delivery state and potential for bleeding/hemorrhage  Goal: Patient physiologically stable as evidenced by normal lochia, palpable uterine involution and vital signs within normal limits  Outcome: PROGRESSING AS EXPECTED  Fundus firm.  Lochia scant to light.    Problem: Potential for postpartum infection related to presence of episiotomy/vaginal tear and/or uterine contamination  Goal: Patient will be absent from signs and symptoms of infection  Outcome: PROGRESSING AS EXPECTED  Patient is afebrile.

## 2019-03-15 NOTE — ANESTHESIA PROCEDURE NOTES
Epidural Block  Performed by: WENDY WELDON  Authorized by: WENDY WELDON     Patient Location:  OB  Start Time:  3/14/2019 5:09 PM  Reason for Block: labor analgesia    patient identified, IV checked, site marked, risks and benefits discussed, surgical consent, monitors and equipment checked, pre-op evaluation and timeout performed    Patient Position:  Sitting  Prep: Betadine, patient draped and sterile technique    Monitoring:  Blood pressure, continuous pulse oximetry and heart rate  Approach:  Midline  Location:  L3-L4  Injection Technique:  FRANSICO air  Needle Type:  Tuohy  Needle Gauge:  17 G  Needle Length:  3.5 in  Loss of resistance::  6  Catheter Size:  19 G  Catheter at Skin Depth:  10  Test Dose:  Lidocaine 1.5% with epinephrine 1-to-200,000  Test Dose Result:  Negative   First pass.  25 G pencil point to CSF.  2 ml 0.1% ropivicaine.

## 2019-03-15 NOTE — ANESTHESIA POSTPROCEDURE EVALUATION
Patient: Karin Briseno    Procedure Summary     Date:  03/14/19 Room / Location:      Anesthesia Start:  1708 Anesthesia Stop:  2027    Procedure:  Labor Epidural Diagnosis:      Scheduled Providers:   Responsible Provider:  Eamon Morton M.D.    Anesthesia Type:  neuraxial block ASA Status:  2          Final Anesthesia Type: neuraxial block  Last vitals  BP   Blood Pressure: 144/67 (03/14/19 2105)    Temp 37.6       Pulse   Pulse: (!) 122 (03/14/19 2105)   Resp        SpO2          Anesthesia Post Evaluation    Patient location during evaluation: PACU  Patient participation: complete - patient participated  Level of consciousness: awake and alert    Airway patency: patent  Anesthetic complications: no  Cardiovascular status: hemodynamically stable  Respiratory status: acceptable  Hydration status: euvolemic    PONV: none

## 2019-03-15 NOTE — ANESTHESIA TIME REPORT
Anesthesia Start and Stop Event Times     Date Time Event    3/14/2019 1708 Anesthesia Start     2027 Anesthesia Stop        Responsible Staff  03/14/19    Name Role Begin End    Eamon Morton M.D. Anesth 1708 2027        Post op Diagnosis  Distress from pain in labor    Premium Reason  A. 3PM - 7AM      Exception Times    Start Time             End Time                    Dr. Sierra Austin

## 2019-03-15 NOTE — PROGRESS NOTES
9224- Bedside report received from VICTORINO Huston.  Patient denied needs.  Patient assessment done.  Patient stated that she is voiding without difficulty and passing flatus.  Patient denied dizziness and stated that she is walking without difficulty.  Discussed pain management plan and patient prefers to call for pain medication as needed.  Reviewed plan of care.  Patient verbalized understanding.  FOB at bedside.  0810- Patient breastfeeding infant independently.

## 2019-03-15 NOTE — LACTATION NOTE
This note was copied from a baby's chart.  Baby 37 weeks, LPI, Baby 16 hours old, baby not on LPI plan at this time. Mother reports baby has been breastfeeding well, last feed breast fed both breasts 45 minutes total, observed deep latch, mother latching independently side lying. LC weighed baby at 16 hours of age, weight loss 2.6%, discussed with patients nurse will continue to monitor.     Reinforced hunger cues, breastfeed when baby shows cues or by 3 hours from last feed, importance of skin to skin, positioning baby at breast, cluster feeding & hand expression.     Breastfeeding POC:  Breastfeed on demand or by 3 hours from last feed, lots of skin to skin. F/U with LTC for outpatient lactation support.

## 2019-03-15 NOTE — PROGRESS NOTES
Progress Note        Subjective: Patient is doing well lochia is normal.    Objective: Blood pressure mildly to moderately elevated  General: Patient's awake alert pleasant  Head: Atraumatic normocephalic  Abdomen: Fundus is firm nontender    Assessment:  Postpartum day 1 vaginal delivery  Gestational HTN    Plan:    DC tomorrow  Routine care  Patient to f/u in one week for blood pressure check    Queta Moore MD

## 2019-03-15 NOTE — PROGRESS NOTES
2200 Report received from Chiquis GLEASON. POC discussed.  2255 Dr. Moore phoned regarding pt B/p. Orders received from Dr. Moore to repreat pt b/p x2 if pt systolic b/p greater than 160 and or diasolitc b/p greater than 105. If so then start pt on Lebetalol 100mg BID. Pt is to sty on L&d for a few hours for further evaluation  2330 Pt up to bathroom with steady shift. Pt voided, manasa care provided and gown changed.   0025 Pt into wheelchair. Personal belongings gathered by FOB. Pt transported to post partum in stable condition.   0030 Rpeort given to Merline GLEASON at bedside. POC discussed

## 2019-03-15 NOTE — CONSULTS
Lactation note:    Initial visit. MOB states that infant breast fed but may have not been an effective latch. She  her other 2 children for 1 year each. Discussed breastfeeding the LPI, and what to watch out for during feedings. Reviewed [facility] LPI handout.     Did discuss normal course of breastfeeding and what to expect at 12-24-48-72 hours. Encouraged frequent skin to skin, and to continue offering breast every 2-3 hours.      Observed MOB attempt to latch infant in sidelying, on the left breast. Infant is asleep, but would open mouth slightly, and latch onto the nipple only. She then would just do a couple of suckles, then go back to sleep again. Encouraged MOB to hand express for infant.     Discussed possibility of implementing feeding plan if infant continues to be sleepy for feedings, and not latching well. MOB verbalized understanding of feeding plan if needed. Discussed with Samira, Anisha.        MOB has no other questions or concerns regarding breastfeeding. Encouraged to call for assistance as needed.

## 2019-03-15 NOTE — LACTATION NOTE
This note was copied from a baby's chart.  Lactation note:    Initial visit. MOB states that infant breast fed but may have not been an effective latch. She  her other 2 children for 1 year each. Discussed breastfeeding the LPI, and what to watch out for during feedings. Reviewed [facility] LPI handout.     Did discuss normal course of breastfeeding and what to expect at 12-24-48-72 hours. Encouraged frequent skin to skin, and to continue offering breast every 2-3 hours.      Observed MOB attempt to latch infant in sidelying, on the left breast. Infant is asleep, but would open mouth slightly, and latch onto the nipple only. She then would just do a couple of suckles, then go back to sleep again. Encouraged MOB to hand express for infant.     Discussed possibility of implementing feeding plan if infant continues to be sleepy for feedings, and not latching well. MOB verbalized understanding of feeding plan if needed. Discussed with Samira, RNs.        MOB has no other questions or concerns regarding breastfeeding. Encouraged to call for assistance as needed.

## 2019-03-16 VITALS
HEART RATE: 96 BPM | SYSTOLIC BLOOD PRESSURE: 134 MMHG | HEIGHT: 66 IN | WEIGHT: 268 LBS | DIASTOLIC BLOOD PRESSURE: 76 MMHG | RESPIRATION RATE: 18 BRPM | TEMPERATURE: 97.4 F | OXYGEN SATURATION: 98 % | BODY MASS INDEX: 43.07 KG/M2

## 2019-03-16 PROBLEM — R52 PAIN RELATED TO VAGINAL DELIVERY: Status: ACTIVE | Noted: 2019-03-16

## 2019-03-16 PROCEDURE — A9270 NON-COVERED ITEM OR SERVICE: HCPCS | Performed by: OBSTETRICS & GYNECOLOGY

## 2019-03-16 PROCEDURE — 700102 HCHG RX REV CODE 250 W/ 637 OVERRIDE(OP): Performed by: OBSTETRICS & GYNECOLOGY

## 2019-03-16 PROCEDURE — 700112 HCHG RX REV CODE 229: Performed by: OBSTETRICS & GYNECOLOGY

## 2019-03-16 RX ORDER — IBUPROFEN 600 MG/1
600 TABLET ORAL EVERY 6 HOURS PRN
Qty: 30 TAB | Refills: 0 | Status: ON HOLD | OUTPATIENT
Start: 2019-03-16 | End: 2019-03-23

## 2019-03-16 RX ORDER — OXYCODONE HYDROCHLORIDE AND ACETAMINOPHEN 5; 325 MG/1; MG/1
1 TABLET ORAL EVERY 4 HOURS PRN
Qty: 8 TAB | Refills: 0 | Status: SHIPPED | OUTPATIENT
Start: 2019-03-16 | End: 2019-03-18

## 2019-03-16 RX ORDER — IBUPROFEN 600 MG/1
600 TABLET ORAL EVERY 6 HOURS PRN
Qty: 30 TAB | Refills: 0 | Status: SHIPPED | OUTPATIENT
Start: 2019-03-16 | End: 2019-03-16

## 2019-03-16 RX ADMIN — OXYCODONE HYDROCHLORIDE AND ACETAMINOPHEN 1 TABLET: 5; 325 TABLET ORAL at 15:51

## 2019-03-16 RX ADMIN — DOCUSATE SODIUM 100 MG: 100 CAPSULE, LIQUID FILLED ORAL at 05:36

## 2019-03-16 RX ADMIN — IBUPROFEN 600 MG: 600 TABLET ORAL at 05:36

## 2019-03-16 RX ADMIN — Medication 1 TABLET: at 05:36

## 2019-03-16 RX ADMIN — IBUPROFEN 600 MG: 600 TABLET ORAL at 12:02

## 2019-03-16 RX ADMIN — OXYCODONE AND ACETAMINOPHEN 1 TABLET: 10; 325 TABLET ORAL at 05:36

## 2019-03-16 NOTE — LACTATION NOTE
Follow up visit. Infant weight loss at 24 hours is 3.36%. MOB states that infant has been able to latch well, and does hear swallows/gulps when infant is latched. She also feels her milk is coming in.     Will have VICTORINO Yu continue to monitor feedings, and if they do change, or infant not able to latch, to have pumping and feeding plan implemented.     Encouraged to call for support as needed.

## 2019-03-16 NOTE — PROGRESS NOTES
Report received from VICTORINO Goel. Assessment completed, VSS. POC, pain management, safe sleep, and feeding frequency discussed, all questions answered. Will continue with post partum care.

## 2019-03-16 NOTE — CARE PLAN
Problem: Safety  Goal: Will remain free from injury  Outcome: PROGRESSING AS EXPECTED  Pt educated on fall safety, encouraged to keep clutter free and well lite environment.     Problem: Venous Thromboembolism (VTW)/Deep Vein Thrombosis (DVT) Prevention:  Goal: Patient will participate in Venous Thrombosis (VTE)/Deep Vein Thrombosis (DVT)Prevention Measures  Outcome: PROGRESSING AS EXPECTED  Pt educated on DVT risk, encouraged to ambulate often.

## 2019-03-16 NOTE — PROGRESS NOTES
2813- Bedside report received from VICTORINO Yu.  Patient denied needs.  8216- Patient assessment done.  Patient stated that she is voiding without difficulty and passing flatus.  Patient denied dizziness and stated that she is walking without difficulty.  Discussed pain management plan and patient prefers to be offered ibuprofen as it becomes available.  Patient will call for further pain intervention as needed.  Reviewed plan of care.  Patient verbalized understanding.  FOB at bedside.

## 2019-03-16 NOTE — LACTATION NOTE
This note was copied from a baby's chart.  Mother feels infant falls asleep quickly at breast during some feeds and is more active for others. Reviewed waking techniques, breast massage/compression/hand expression, and assisted with deeper latch. After achieving deep latch, mother feels infant has been latching shallowly with prior feeds, she does have small blisters and compression lines present on both nipples. Plan to observe full feed next time and determine if feeding plan necessary prior to discharge home.

## 2019-03-16 NOTE — PROGRESS NOTES
1550- Discharge instructions given to patient who verbalized understanding and stated she has no questions.  Rx's handed to patient after named verified.  Patient will call when ready for escort out to vehicle.  1705- Patient stated that she is ready for discharge.  Patient discharged to home, no change noted in condition, via wheelchair with infant and FOB.

## 2019-03-16 NOTE — DISCHARGE SUMMARY
"Discharge Summary    Admission Date: 3/14/19    Discharge Date: 3/16/19    Diagnosis:Active Problems:     (spontaneous vaginal delivery)    Subjective: Pain controlled. Normal lochia. Eating, voiding and ambulating without difficulty. Breast feeding. Denies HA, blurry vision or epigastric pain. Denies CP or SOB. Swelling LE but not significant. Denies orthopnea.     /78   Pulse 98   Temp 36.4 °C (97.5 °F) (Temporal)   Resp 16   Ht 1.676 m (5' 6\")   Wt 121.6 kg (268 lb)   SpO2 96%   Breastfeeding? Yes   BMI 43.26 kg/m²     GEN: NAD  CV: RRR, no murmurs  Pulm: lungs CTAB, no wheezes or crackles  GI:soft, NT, ND  :fundus firm and below umbilicus  EXT:no edema    Hospital Course: Karin Briseno is a 37 yo  who presented at 37w0d for induction of labor for gestational hypertension. She is s/p  of a viable female infant with APGARS 8/9. EBL 150cc. First degree laceration repaired. Post partum course was complicated by persistent tachycardia. EKG demonstrated sinus tachycardia. TSH within normal limits.Insignificant H/H drop. No evidence of infection. It trended towards improvement and she was ultimately stable and requesting discharge home on PPD#1.     Discharge Instructions   1. Diet : general  2. Activity: Pelvic rest for 6 weeks     Karin Briseno   Home Medication Instructions CAROLINA:[id number]    Printed on:19 0822   Medication Information                      docusate sodium (COLACE) 100 MG Cap  Take 100 mg by mouth 2 times a day.             ibuprofen (MOTRIN) 600 MG Tab  Take 1 Tab by mouth every 6 hours as needed (For cramping after delivery; do not give if patient is receiving ketorolac (Toradol)).             oxyCODONE-acetaminophen (PERCOCET) 5-325 MG Tab  Take 1 Tab by mouth every four hours as needed for up to 2 days.             Prenatal MV-Min-Fe Fum-FA-DHA (PRENATAL 1 PO)  Take  by mouth.             vitamin D (CHOLECALCIFEROL) 1000 UNIT Tab  Take 1,000 Units by mouth " every day.                   Follow up: 1 week for BP check    Complications:none    Renata Martin M.D.

## 2019-03-20 ENCOUNTER — HOSPITAL ENCOUNTER (EMERGENCY)
Facility: MEDICAL CENTER | Age: 39
End: 2019-03-20
Attending: EMERGENCY MEDICINE
Payer: COMMERCIAL

## 2019-03-20 ENCOUNTER — APPOINTMENT (OUTPATIENT)
Dept: RADIOLOGY | Facility: MEDICAL CENTER | Age: 39
End: 2019-03-20
Attending: EMERGENCY MEDICINE
Payer: COMMERCIAL

## 2019-03-20 ENCOUNTER — HOSPITAL ENCOUNTER (INPATIENT)
Facility: MEDICAL CENTER | Age: 39
LOS: 3 days | DRG: 776 | End: 2019-03-23
Attending: OBSTETRICS & GYNECOLOGY | Admitting: OBSTETRICS & GYNECOLOGY
Payer: COMMERCIAL

## 2019-03-20 VITALS
DIASTOLIC BLOOD PRESSURE: 111 MMHG | BODY MASS INDEX: 41.17 KG/M2 | OXYGEN SATURATION: 96 % | HEIGHT: 66 IN | TEMPERATURE: 96.8 F | WEIGHT: 256.17 LBS | RESPIRATION RATE: 18 BRPM | SYSTOLIC BLOOD PRESSURE: 194 MMHG | HEART RATE: 64 BPM

## 2019-03-20 LAB
ALBUMIN SERPL BCP-MCNC: 3.1 G/DL (ref 3.2–4.9)
ALBUMIN/GLOB SERPL: 0.9 G/DL
ALP SERPL-CCNC: 57 U/L (ref 30–99)
ALT SERPL-CCNC: 37 U/L (ref 2–50)
ANION GAP SERPL CALC-SCNC: 10 MMOL/L (ref 0–11.9)
AST SERPL-CCNC: 29 U/L (ref 12–45)
BASOPHILS # BLD AUTO: 0.4 % (ref 0–1.8)
BASOPHILS # BLD: 0.05 K/UL (ref 0–0.12)
BILIRUB SERPL-MCNC: 0.4 MG/DL (ref 0.1–1.5)
BUN SERPL-MCNC: 16 MG/DL (ref 8–22)
CALCIUM SERPL-MCNC: 9.6 MG/DL (ref 8.4–10.2)
CHLORIDE SERPL-SCNC: 105 MMOL/L (ref 96–112)
CO2 SERPL-SCNC: 23 MMOL/L (ref 20–33)
CREAT SERPL-MCNC: 0.82 MG/DL (ref 0.5–1.4)
EKG IMPRESSION: NORMAL
EOSINOPHIL # BLD AUTO: 0.33 K/UL (ref 0–0.51)
EOSINOPHIL NFR BLD: 2.7 % (ref 0–6.9)
ERYTHROCYTE [DISTWIDTH] IN BLOOD BY AUTOMATED COUNT: 45 FL (ref 35.9–50)
GLOBULIN SER CALC-MCNC: 3.6 G/DL (ref 1.9–3.5)
GLUCOSE SERPL-MCNC: 97 MG/DL (ref 65–99)
HCT VFR BLD AUTO: 35.6 % (ref 37–47)
HGB BLD-MCNC: 11.8 G/DL (ref 12–16)
IMM GRANULOCYTES # BLD AUTO: 0.21 K/UL (ref 0–0.11)
IMM GRANULOCYTES NFR BLD AUTO: 1.7 % (ref 0–0.9)
LYMPHOCYTES # BLD AUTO: 3.82 K/UL (ref 1–4.8)
LYMPHOCYTES NFR BLD: 31.8 % (ref 22–41)
MAGNESIUM SERPL-MCNC: 1.6 MG/DL (ref 1.5–2.5)
MAGNESIUM SERPL-MCNC: 2.9 MG/DL (ref 1.5–2.5)
MCH RBC QN AUTO: 28.9 PG (ref 27–33)
MCHC RBC AUTO-ENTMCNC: 33.1 G/DL (ref 33.6–35)
MCV RBC AUTO: 87.3 FL (ref 81.4–97.8)
MONOCYTES # BLD AUTO: 0.96 K/UL (ref 0–0.85)
MONOCYTES NFR BLD AUTO: 8 % (ref 0–13.4)
NEUTROPHILS # BLD AUTO: 6.66 K/UL (ref 2–7.15)
NEUTROPHILS NFR BLD: 55.4 % (ref 44–72)
NRBC # BLD AUTO: 0 K/UL
NRBC BLD-RTO: 0 /100 WBC
PLATELET # BLD AUTO: 265 K/UL (ref 164–446)
PMV BLD AUTO: 8.6 FL (ref 9–12.9)
POTASSIUM SERPL-SCNC: 3.6 MMOL/L (ref 3.6–5.5)
PROT SERPL-MCNC: 6.7 G/DL (ref 6–8.2)
RBC # BLD AUTO: 4.08 M/UL (ref 4.2–5.4)
SODIUM SERPL-SCNC: 138 MMOL/L (ref 135–145)
URATE SERPL-MCNC: 8 MG/DL (ref 1.9–8.2)
WBC # BLD AUTO: 12 K/UL (ref 4.8–10.8)

## 2019-03-20 PROCEDURE — 36415 COLL VENOUS BLD VENIPUNCTURE: CPT

## 2019-03-20 PROCEDURE — 770002 HCHG ROOM/CARE - OB PRIVATE (112)

## 2019-03-20 PROCEDURE — 85025 COMPLETE CBC W/AUTO DIFF WBC: CPT

## 2019-03-20 PROCEDURE — 700101 HCHG RX REV CODE 250: Performed by: OBSTETRICS & GYNECOLOGY

## 2019-03-20 PROCEDURE — 700111 HCHG RX REV CODE 636 W/ 250 OVERRIDE (IP): Performed by: EMERGENCY MEDICINE

## 2019-03-20 PROCEDURE — 700102 HCHG RX REV CODE 250 W/ 637 OVERRIDE(OP): Performed by: OBSTETRICS & GYNECOLOGY

## 2019-03-20 PROCEDURE — 700105 HCHG RX REV CODE 258

## 2019-03-20 PROCEDURE — 83735 ASSAY OF MAGNESIUM: CPT

## 2019-03-20 PROCEDURE — 83735 ASSAY OF MAGNESIUM: CPT | Mod: 91

## 2019-03-20 PROCEDURE — 96375 TX/PRO/DX INJ NEW DRUG ADDON: CPT

## 2019-03-20 PROCEDURE — 93005 ELECTROCARDIOGRAM TRACING: CPT | Performed by: EMERGENCY MEDICINE

## 2019-03-20 PROCEDURE — 700101 HCHG RX REV CODE 250: Performed by: EMERGENCY MEDICINE

## 2019-03-20 PROCEDURE — 80053 COMPREHEN METABOLIC PANEL: CPT

## 2019-03-20 PROCEDURE — 96374 THER/PROPH/DIAG INJ IV PUSH: CPT

## 2019-03-20 PROCEDURE — A9270 NON-COVERED ITEM OR SERVICE: HCPCS | Performed by: OBSTETRICS & GYNECOLOGY

## 2019-03-20 PROCEDURE — 99285 EMERGENCY DEPT VISIT HI MDM: CPT

## 2019-03-20 PROCEDURE — 302135 SEQUENTIAL COMPRESSION MACHINE: Performed by: OBSTETRICS & GYNECOLOGY

## 2019-03-20 PROCEDURE — 70450 CT HEAD/BRAIN W/O DYE: CPT

## 2019-03-20 PROCEDURE — 84550 ASSAY OF BLOOD/URIC ACID: CPT

## 2019-03-20 PROCEDURE — 700111 HCHG RX REV CODE 636 W/ 250 OVERRIDE (IP)

## 2019-03-20 RX ORDER — MAGNESIUM SULFATE HEPTAHYDRATE 40 MG/ML
4 INJECTION, SOLUTION INTRAVENOUS ONCE
Status: DISCONTINUED | OUTPATIENT
Start: 2019-03-20 | End: 2019-03-20 | Stop reason: HOSPADM

## 2019-03-20 RX ORDER — SODIUM CHLORIDE, SODIUM LACTATE, POTASSIUM CHLORIDE, CALCIUM CHLORIDE 600; 310; 30; 20 MG/100ML; MG/100ML; MG/100ML; MG/100ML
1000 INJECTION, SOLUTION INTRAVENOUS CONTINUOUS
Status: DISCONTINUED | OUTPATIENT
Start: 2019-03-20 | End: 2019-03-23 | Stop reason: HOSPADM

## 2019-03-20 RX ORDER — MAGNESIUM SULFATE HEPTAHYDRATE 40 MG/ML
INJECTION, SOLUTION INTRAVENOUS
Status: DISCONTINUED
Start: 2019-03-20 | End: 2019-03-20 | Stop reason: HOSPADM

## 2019-03-20 RX ORDER — LABETALOL HYDROCHLORIDE 5 MG/ML
10 INJECTION, SOLUTION INTRAVENOUS ONCE
Status: COMPLETED | OUTPATIENT
Start: 2019-03-20 | End: 2019-03-20

## 2019-03-20 RX ORDER — HYDRALAZINE HYDROCHLORIDE 20 MG/ML
10 INJECTION INTRAMUSCULAR; INTRAVENOUS ONCE
Status: COMPLETED | OUTPATIENT
Start: 2019-03-21 | End: 2019-03-20

## 2019-03-20 RX ORDER — MAGNESIUM SULFATE HEPTAHYDRATE 40 MG/ML
2 INJECTION, SOLUTION INTRAVENOUS CONTINUOUS
Status: DISCONTINUED | OUTPATIENT
Start: 2019-03-20 | End: 2019-03-22

## 2019-03-20 RX ORDER — MAGNESIUM SULFATE HEPTAHYDRATE 40 MG/ML
INJECTION, SOLUTION INTRAVENOUS
Status: COMPLETED
Start: 2019-03-20 | End: 2019-03-20

## 2019-03-20 RX ORDER — LABETALOL 100 MG/1
100 TABLET, FILM COATED ORAL 2 TIMES DAILY
Status: DISCONTINUED | OUTPATIENT
Start: 2019-03-20 | End: 2019-03-23 | Stop reason: HOSPADM

## 2019-03-20 RX ORDER — ACETAMINOPHEN 325 MG/1
650 TABLET ORAL EVERY 4 HOURS PRN
Status: DISCONTINUED | OUTPATIENT
Start: 2019-03-20 | End: 2019-03-23 | Stop reason: HOSPADM

## 2019-03-20 RX ORDER — HYDRALAZINE HYDROCHLORIDE 20 MG/ML
INJECTION INTRAMUSCULAR; INTRAVENOUS
Status: COMPLETED
Start: 2019-03-20 | End: 2019-03-20

## 2019-03-20 RX ORDER — LABETALOL HYDROCHLORIDE 5 MG/ML
20 INJECTION, SOLUTION INTRAVENOUS ONCE
Status: COMPLETED | OUTPATIENT
Start: 2019-03-20 | End: 2019-03-20

## 2019-03-20 RX ORDER — SODIUM CHLORIDE, SODIUM LACTATE, POTASSIUM CHLORIDE, CALCIUM CHLORIDE 600; 310; 30; 20 MG/100ML; MG/100ML; MG/100ML; MG/100ML
INJECTION, SOLUTION INTRAVENOUS
Status: COMPLETED
Start: 2019-03-20 | End: 2019-03-20

## 2019-03-20 RX ORDER — ONDANSETRON 2 MG/ML
4 INJECTION INTRAMUSCULAR; INTRAVENOUS ONCE
Status: COMPLETED | OUTPATIENT
Start: 2019-03-20 | End: 2019-03-20

## 2019-03-20 RX ORDER — MAGNESIUM SULFATE HEPTAHYDRATE 40 MG/ML
2 INJECTION, SOLUTION INTRAVENOUS ONCE
Status: COMPLETED | OUTPATIENT
Start: 2019-03-20 | End: 2019-03-20

## 2019-03-20 RX ADMIN — HYDRALAZINE HYDROCHLORIDE 10 MG: 20 INJECTION INTRAMUSCULAR; INTRAVENOUS at 23:58

## 2019-03-20 RX ADMIN — ONDANSETRON 4 MG: 2 INJECTION INTRAMUSCULAR; INTRAVENOUS at 20:29

## 2019-03-20 RX ADMIN — LABETALOL HYDROCHLORIDE 10 MG: 5 INJECTION, SOLUTION INTRAVENOUS at 21:15

## 2019-03-20 RX ADMIN — FENTANYL CITRATE 50 MCG: 50 INJECTION INTRAMUSCULAR; INTRAVENOUS at 20:27

## 2019-03-20 RX ADMIN — LABETALOL HYDROCHLORIDE 10 MG: 5 INJECTION INTRAVENOUS at 20:45

## 2019-03-20 RX ADMIN — SODIUM CHLORIDE, POTASSIUM CHLORIDE, SODIUM LACTATE AND CALCIUM CHLORIDE 1000 ML: 600; 310; 30; 20 INJECTION, SOLUTION INTRAVENOUS at 22:32

## 2019-03-20 RX ADMIN — MAGNESIUM SULFATE HEPTAHYDRATE 2 G/HR: 40 INJECTION, SOLUTION INTRAVENOUS at 22:31

## 2019-03-20 RX ADMIN — LABETALOL HYDROCHLORIDE 100 MG: 100 TABLET, FILM COATED ORAL at 22:36

## 2019-03-20 RX ADMIN — ACETAMINOPHEN 650 MG: 325 TABLET, FILM COATED ORAL at 22:36

## 2019-03-20 RX ADMIN — MAGNESIUM SULFATE HEPTAHYDRATE 2 G: 40 INJECTION, SOLUTION INTRAVENOUS at 20:37

## 2019-03-20 RX ADMIN — LABETALOL HYDROCHLORIDE 20 MG: 5 INJECTION INTRAVENOUS at 22:36

## 2019-03-20 ASSESSMENT — PATIENT HEALTH QUESTIONNAIRE - PHQ9
2. FEELING DOWN, DEPRESSED, IRRITABLE, OR HOPELESS: NOT AT ALL
1. LITTLE INTEREST OR PLEASURE IN DOING THINGS: NOT AT ALL
SUM OF ALL RESPONSES TO PHQ9 QUESTIONS 1 AND 2: 0

## 2019-03-21 ENCOUNTER — APPOINTMENT (OUTPATIENT)
Dept: RADIOLOGY | Facility: MEDICAL CENTER | Age: 39
DRG: 776 | End: 2019-03-21
Attending: OBSTETRICS & GYNECOLOGY
Payer: COMMERCIAL

## 2019-03-21 ENCOUNTER — APPOINTMENT (OUTPATIENT)
Dept: RADIOLOGY | Facility: MEDICAL CENTER | Age: 39
DRG: 776 | End: 2019-03-21
Attending: STUDENT IN AN ORGANIZED HEALTH CARE EDUCATION/TRAINING PROGRAM
Payer: COMMERCIAL

## 2019-03-21 LAB
ALBUMIN SERPL BCP-MCNC: 3.3 G/DL (ref 3.2–4.9)
ALBUMIN/GLOB SERPL: 1.3 G/DL
ALP SERPL-CCNC: 60 U/L (ref 30–99)
ALT SERPL-CCNC: 30 U/L (ref 2–50)
ANION GAP SERPL CALC-SCNC: 11 MMOL/L (ref 0–11.9)
AST SERPL-CCNC: 18 U/L (ref 12–45)
BASOPHILS # BLD AUTO: 0.3 % (ref 0–1.8)
BASOPHILS # BLD: 0.03 K/UL (ref 0–0.12)
BILIRUB SERPL-MCNC: 0.2 MG/DL (ref 0.1–1.5)
BUN SERPL-MCNC: 13 MG/DL (ref 8–22)
CALCIUM SERPL-MCNC: 8.5 MG/DL (ref 8.5–10.5)
CHLORIDE SERPL-SCNC: 101 MMOL/L (ref 96–112)
CO2 SERPL-SCNC: 23 MMOL/L (ref 20–33)
CREAT SERPL-MCNC: 0.73 MG/DL (ref 0.5–1.4)
EOSINOPHIL # BLD AUTO: 0.34 K/UL (ref 0–0.51)
EOSINOPHIL NFR BLD: 2.9 % (ref 0–6.9)
ERYTHROCYTE [DISTWIDTH] IN BLOOD BY AUTOMATED COUNT: 46.8 FL (ref 35.9–50)
GLOBULIN SER CALC-MCNC: 2.6 G/DL (ref 1.9–3.5)
GLUCOSE SERPL-MCNC: 116 MG/DL (ref 65–99)
HCT VFR BLD AUTO: 35 % (ref 37–47)
HGB BLD-MCNC: 11.6 G/DL (ref 12–16)
IMM GRANULOCYTES # BLD AUTO: 0.17 K/UL (ref 0–0.11)
IMM GRANULOCYTES NFR BLD AUTO: 1.5 % (ref 0–0.9)
LYMPHOCYTES # BLD AUTO: 2.71 K/UL (ref 1–4.8)
LYMPHOCYTES NFR BLD: 23.1 % (ref 22–41)
MAGNESIUM SERPL-MCNC: 4.6 MG/DL (ref 1.5–2.5)
MAGNESIUM SERPL-MCNC: 5.3 MG/DL (ref 1.5–2.5)
MAGNESIUM SERPL-MCNC: 5.8 MG/DL (ref 1.5–2.5)
MCH RBC QN AUTO: 29.7 PG (ref 27–33)
MCHC RBC AUTO-ENTMCNC: 33.1 G/DL (ref 33.6–35)
MCV RBC AUTO: 89.7 FL (ref 81.4–97.8)
MONOCYTES # BLD AUTO: 0.91 K/UL (ref 0–0.85)
MONOCYTES NFR BLD AUTO: 7.8 % (ref 0–13.4)
NEUTROPHILS # BLD AUTO: 7.56 K/UL (ref 2–7.15)
NEUTROPHILS NFR BLD: 64.4 % (ref 44–72)
NRBC # BLD AUTO: 0 K/UL
NRBC BLD-RTO: 0 /100 WBC
PLATELET # BLD AUTO: 266 K/UL (ref 164–446)
PMV BLD AUTO: 8.9 FL (ref 9–12.9)
POTASSIUM SERPL-SCNC: 3.7 MMOL/L (ref 3.6–5.5)
PROT SERPL-MCNC: 5.9 G/DL (ref 6–8.2)
RBC # BLD AUTO: 3.9 M/UL (ref 4.2–5.4)
SODIUM SERPL-SCNC: 135 MMOL/L (ref 135–145)
WBC # BLD AUTO: 11.7 K/UL (ref 4.8–10.8)

## 2019-03-21 PROCEDURE — 700111 HCHG RX REV CODE 636 W/ 250 OVERRIDE (IP): Performed by: OBSTETRICS & GYNECOLOGY

## 2019-03-21 PROCEDURE — 700105 HCHG RX REV CODE 258: Performed by: OBSTETRICS & GYNECOLOGY

## 2019-03-21 PROCEDURE — 83735 ASSAY OF MAGNESIUM: CPT

## 2019-03-21 PROCEDURE — 80053 COMPREHEN METABOLIC PANEL: CPT

## 2019-03-21 PROCEDURE — 700102 HCHG RX REV CODE 250 W/ 637 OVERRIDE(OP): Performed by: OBSTETRICS & GYNECOLOGY

## 2019-03-21 PROCEDURE — 36415 COLL VENOUS BLD VENIPUNCTURE: CPT

## 2019-03-21 PROCEDURE — 85025 COMPLETE CBC W/AUTO DIFF WBC: CPT

## 2019-03-21 PROCEDURE — 770002 HCHG ROOM/CARE - OB PRIVATE (112)

## 2019-03-21 PROCEDURE — A9270 NON-COVERED ITEM OR SERVICE: HCPCS | Performed by: OBSTETRICS & GYNECOLOGY

## 2019-03-21 PROCEDURE — 71275 CT ANGIOGRAPHY CHEST: CPT

## 2019-03-21 PROCEDURE — 700117 HCHG RX CONTRAST REV CODE 255: Performed by: OBSTETRICS & GYNECOLOGY

## 2019-03-21 PROCEDURE — 700101 HCHG RX REV CODE 250: Performed by: OBSTETRICS & GYNECOLOGY

## 2019-03-21 PROCEDURE — 302790 HCHG STAT ANTEPARTUM CARE, DAILY

## 2019-03-21 PROCEDURE — 99253 IP/OBS CNSLTJ NEW/EST LOW 45: CPT | Mod: GC | Performed by: HOSPITALIST

## 2019-03-21 PROCEDURE — 71046 X-RAY EXAM CHEST 2 VIEWS: CPT

## 2019-03-21 RX ORDER — HYDRALAZINE HYDROCHLORIDE 20 MG/ML
5-10 INJECTION INTRAMUSCULAR; INTRAVENOUS PRN
Status: DISCONTINUED | OUTPATIENT
Start: 2019-03-21 | End: 2019-03-23 | Stop reason: HOSPADM

## 2019-03-21 RX ORDER — MORPHINE SULFATE 10 MG/ML
5 INJECTION, SOLUTION INTRAMUSCULAR; INTRAVENOUS ONCE
Status: COMPLETED | OUTPATIENT
Start: 2019-03-21 | End: 2019-03-21

## 2019-03-21 RX ORDER — LABETALOL HYDROCHLORIDE 5 MG/ML
20-40 INJECTION, SOLUTION INTRAVENOUS PRN
Status: DISCONTINUED | OUTPATIENT
Start: 2019-03-21 | End: 2019-03-23 | Stop reason: HOSPADM

## 2019-03-21 RX ORDER — MAGNESIUM SULFATE HEPTAHYDRATE 40 MG/ML
2 INJECTION, SOLUTION INTRAVENOUS ONCE
Status: COMPLETED | OUTPATIENT
Start: 2019-03-21 | End: 2019-03-21

## 2019-03-21 RX ORDER — ONDANSETRON 2 MG/ML
4 INJECTION INTRAMUSCULAR; INTRAVENOUS EVERY 4 HOURS PRN
Status: DISCONTINUED | OUTPATIENT
Start: 2019-03-21 | End: 2019-03-23 | Stop reason: HOSPADM

## 2019-03-21 RX ORDER — OXYCODONE HYDROCHLORIDE AND ACETAMINOPHEN 5; 325 MG/1; MG/1
1-2 TABLET ORAL EVERY 4 HOURS PRN
Status: DISCONTINUED | OUTPATIENT
Start: 2019-03-21 | End: 2019-03-23 | Stop reason: HOSPADM

## 2019-03-21 RX ADMIN — LABETALOL HYDROCHLORIDE 100 MG: 100 TABLET, FILM COATED ORAL at 05:11

## 2019-03-21 RX ADMIN — LABETALOL HYDROCHLORIDE 100 MG: 100 TABLET, FILM COATED ORAL at 17:42

## 2019-03-21 RX ADMIN — IOHEXOL 75 ML: 350 INJECTION, SOLUTION INTRAVENOUS at 11:21

## 2019-03-21 RX ADMIN — OXYCODONE AND ACETAMINOPHEN 2 TABLET: 5; 325 TABLET ORAL at 22:06

## 2019-03-21 RX ADMIN — MORPHINE SULFATE 5 MG: 10 INJECTION INTRAVENOUS at 06:53

## 2019-03-21 RX ADMIN — ONDANSETRON 4 MG: 2 INJECTION INTRAMUSCULAR; INTRAVENOUS at 05:11

## 2019-03-21 RX ADMIN — OXYCODONE AND ACETAMINOPHEN 2 TABLET: 5; 325 TABLET ORAL at 05:11

## 2019-03-21 RX ADMIN — SODIUM CHLORIDE, POTASSIUM CHLORIDE, SODIUM LACTATE AND CALCIUM CHLORIDE 1000 ML: 600; 310; 30; 20 INJECTION, SOLUTION INTRAVENOUS at 10:29

## 2019-03-21 RX ADMIN — LABETALOL HYDROCHLORIDE 20 MG: 5 INJECTION INTRAVENOUS at 00:50

## 2019-03-21 RX ADMIN — MAGNESIUM SULFATE IN WATER 2 G: 40 INJECTION, SOLUTION INTRAVENOUS at 00:30

## 2019-03-21 RX ADMIN — MAGNESIUM SULFATE HEPTAHYDRATE 2 G/HR: 40 INJECTION, SOLUTION INTRAVENOUS at 15:54

## 2019-03-21 RX ADMIN — OXYCODONE AND ACETAMINOPHEN 2 TABLET: 5; 325 TABLET ORAL at 11:31

## 2019-03-21 ASSESSMENT — LIFESTYLE VARIABLES: EVER_SMOKED: NEVER

## 2019-03-21 ASSESSMENT — PATIENT HEALTH QUESTIONNAIRE - PHQ9
SUM OF ALL RESPONSES TO PHQ9 QUESTIONS 1 AND 2: 0
SUM OF ALL RESPONSES TO PHQ9 QUESTIONS 1 AND 2: 0
2. FEELING DOWN, DEPRESSED, IRRITABLE, OR HOPELESS: NOT AT ALL
2. FEELING DOWN, DEPRESSED, IRRITABLE, OR HOPELESS: NOT AT ALL
1. LITTLE INTEREST OR PLEASURE IN DOING THINGS: NOT AT ALL
1. LITTLE INTEREST OR PLEASURE IN DOING THINGS: NOT AT ALL

## 2019-03-21 NOTE — PROGRESS NOTES
"OB Progress Note    HA resolved with morphine. Denies blurry vision. Denies epigastric pain. Placed on O2 as her pulse ox was in the low 80's.     /75   Pulse 89   Temp (!) 35.3 °C (95.6 °F) (Temporal)   Resp 16   Ht 1.676 m (5' 6\")   Wt 116 kg (255 lb 11.7 oz)   SpO2 (!) 83%   Breastfeeding? Yes   BMI 41.28 kg/m²     Gen: NAD  Pulm: CTAB, no wheezes, rales or rhonchi  Abd: soft, non tender to palpation  Ext: no edema    Recent Labs      03/20/19   2124  03/21/19   0445   WBC  12.0*  11.7*   RBC  4.08*  3.90*   HEMOGLOBIN  11.8*  11.6*   HEMATOCRIT  35.6*  35.0*   MCV  87.3  89.7   MCH  28.9  29.7   RDW  45.0  46.8   PLATELETCT  265  266   MPV  8.6*  8.9*   NEUTSPOLYS  55.40  64.40   LYMPHOCYTES  31.80  23.10   MONOCYTES  8.00  7.80   EOSINOPHILS  2.70  2.90   BASOPHILS  0.40  0.30     Creatinine:0.76  AST/ALT:18/30    Assessment:   1. Post partum day #6  2. Preeclampsia with severe features  3. Abnormal pulse oximetry examination    Plan: CTPE to rule out pulmonary embolism vs pulm edema. Lung exam clinically normal. Continue magnesium for 24 total hours. BP currently normotensive to mild range on labetalol 100mg BID. Will continue to monitor closely.     Renata Martin M.D.      "

## 2019-03-21 NOTE — LACTATION NOTE
Mother reports that she is breastfeeding her  without difficulty or discomfort. Larger pump flanges provided, 30.5 mm.

## 2019-03-21 NOTE — ED TRIAGE NOTES
Pt bib family with c/o blood pressure problems. Pt states she had an induced labor on 3/14 at [facility] where her BP was noted to be elevated. Pt was discharged with the intent of a follow-up visit for BP control. Pt states that she developed a headache recently and during this time noted her BP to be high.

## 2019-03-21 NOTE — PROGRESS NOTES
OB Progress Note    Discussed negative CT PE results. Oxygen removed. After ~10 minutes, desaturation to 80's noted. Oxygen replaced. Will consult IM for evaluation of hypoxia.

## 2019-03-21 NOTE — ED PROVIDER NOTES
ED Provider Note    ER PROVIDER NOTE        CHIEF COMPLAINT  Chief Complaint   Patient presents with   • Blood Pressure Problem       HPI  Karin Briseno is a 38 y.o. female who presents to the emergency department complaining of headache and blood pressure problems.  Patient's had induced vaginal delivery on 314 due to gestational hypertension.  States she had been doing well, blood pressure is in 130s when released from the hospital.  This afternoon she noticed headache that has been gradually worsening through the evening, feels like a pounding in her ears and noted her blood pressure was quite high.  Headache is a pressure sensation, she denies any nausea or vomiting, does not seem to have any alleviating or aggravating factors.  She denies any visual symptoms focal weakness numbness or tingling.  She states she has some swelling in her feet but this seems to be the same as through her pregnancy.  She denies any abdominal pain.  She denies any chest pain or shortness of breath    REVIEW OF SYSTEMS  Pertinent positives include headache. Pertinent negatives include no abdominal pain. See HPI for details. All other systems reviewed and are negative.    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    SURGICAL HISTORY  patient denies any surgical history    FAMILY HISTORY  Family History   Problem Relation Age of Onset   • Cancer Father         Hx testicular   • Diabetes Father    • Heart Disease Father    • Hypertension Father    • Cancer Maternal Grandmother         MGGM Colon cancer 80s)       SOCIAL HISTORY  Social History     Social History   • Marital status:      Spouse name: N/A   • Number of children: 1   • Years of education: N/A     Occupational History   •  [employer]  "    Social History Main Topics   • Smoking status: Never Smoker   • Smokeless tobacco: Never Used   • Alcohol use No      Comment:  Rare   • Drug use: No   • Sexual activity: Yes     Partners: Male     Other Topics Concern   • Not on file     Social History Narrative    Mother of 2. Works for [employer]      History   Drug Use No       CURRENT MEDICATIONS  Home Medications     Reviewed by Peter Norwood R.N. (Registered Nurse) on 03/20/19 at 1931  Med List Status: Partial   Medication Last Dose Status   docusate sodium (COLACE) 100 MG Cap  Active   ibuprofen (MOTRIN) 600 MG Tab  Active   Prenatal MV-Min-Fe Fum-FA-DHA (PRENATAL 1 PO)  Active   vitamin D (CHOLECALCIFEROL) 1000 UNIT Tab  Active                ALLERGIES  Allergies   Allergen Reactions   • Codeine Vomiting       PHYSICAL EXAM  VITAL SIGNS: BP (!) 194/111   Pulse 64   Temp 36 °C (96.8 °F) (Temporal)   Resp 18   Ht 1.676 m (5' 6\")   Wt 116.2 kg (256 lb 2.8 oz)   SpO2 96%   BMI 41.35 kg/m²   Pulse ox interpretation: I interpret this pulse ox as normal.    Constitutional: Alert in no apparent distress.  HENT: No signs of trauma, Bilateral external ears normal, Nose normal.   Eyes: Pupils are equal and reactive, Conjunctiva normal, Non-icteric.   Neck: Normal range of motion, No tenderness, Supple, No stridor.   Lymphatic: No lymphadenopathy noted.   Cardiovascular: Regular rate and rhythm, no murmurs.   Thorax & Lungs: Normal breath sounds, No respiratory distress, No wheezing, No chest tenderness.   Abdomen: Bowel sounds normal, Soft, No tenderness, No masses, No pulsatile masses. No peritoneal signs.  Skin: Warm, Dry, No erythema, No rash.   Back: No bony tenderness, No CVA tenderness.   Extremities: Intact distal pulses, mild bilateral edema, No tenderness, No cyanosis,  Musculoskeletal: Good range of motion in all major joints. No tenderness to palpation or major deformities noted.   Neurologic: Alert , Normal motor function, Normal sensory function, " No focal deficits noted.  Appropriate patellar reflexes bilaterally, no clonus  Psychiatric: Affect normal, Judgment normal, Mood normal.     DIAGNOSTIC STUDIES / PROCEDURES    Results for orders placed or performed during the hospital encounter of 19   CBC WITH DIFFERENTIAL   Result Value Ref Range    WBC 12.0 (H) 4.8 - 10.8 K/uL    RBC 4.08 (L) 4.20 - 5.40 M/uL    Hemoglobin 11.8 (L) 12.0 - 16.0 g/dL    Hematocrit 35.6 (L) 37.0 - 47.0 %    MCV 87.3 81.4 - 97.8 fL    MCH 28.9 27.0 - 33.0 pg    MCHC 33.1 (L) 33.6 - 35.0 g/dL    RDW 45.0 35.9 - 50.0 fL    Platelet Count 265 164 - 446 K/uL    MPV 8.6 (L) 9.0 - 12.9 fL    Neutrophils-Polys 55.40 44.00 - 72.00 %    Lymphocytes 31.80 22.00 - 41.00 %    Monocytes 8.00 0.00 - 13.40 %    Eosinophils 2.70 0.00 - 6.90 %    Basophils 0.40 0.00 - 1.80 %    Immature Granulocytes 1.70 (H) 0.00 - 0.90 %    Nucleated RBC 0.00 /100 WBC    Neutrophils (Absolute) 6.66 2.00 - 7.15 K/uL    Lymphs (Absolute) 3.82 1.00 - 4.80 K/uL    Monos (Absolute) 0.96 (H) 0.00 - 0.85 K/uL    Eos (Absolute) 0.33 0.00 - 0.51 K/uL    Baso (Absolute) 0.05 0.00 - 0.12 K/uL    Immature Granulocytes (abs) 0.21 (H) 0.00 - 0.11 K/uL    NRBC (Absolute) 0.00 K/uL   EKG (Now)   Result Value Ref Range    Report       [facility] Emergency Dept.    Test Date:  2019  Pt Name:    ROBIN ALFRED                Department: [facility]  MRN:        [id number]                      [facility]  Gender:     Female                       Technician: EFREN  :        1980                   Requested By:AMBROSIO SANCHEZ  Order #:    [id number]                    Reading MD: AMBROSIO SANCHEZ MD    Measurements  Intervals                                Axis  Rate:       74                           P:          18  ID:         [id number]                          QRS:        25  QRSD:       90                           T:          25  QT:         391  QTc:        434    Interpretive Statements  Sinus  rhythm  Baseline wander in lead(s) I,III,aVL,V2  Compared to ECG 03/15/2019 14:46:22  Sinus tachycardia no longer present    Electronically Signed On 3- 21:20:43 PDT by AMBROSIO SANCHEZ MD           LABS  Labs Reviewed   CBC WITH DIFFERENTIAL - Abnormal; Notable for the following:        Result Value    WBC 12.0 (*)     RBC 4.08 (*)     Hemoglobin 11.8 (*)     Hematocrit 35.6 (*)     MCHC 33.1 (*)     MPV 8.6 (*)     Immature Granulocytes 1.70 (*)     Monos (Absolute) 0.96 (*)     Immature Granulocytes (abs) 0.21 (*)     All other components within normal limits   COMP METABOLIC PANEL   MAGNESIUM       All labs reviewed by me.    RADIOLOGY  CT-HEAD W/O   Final Result         1.  No acute intracranial abnormality.        The radiologist's interpretation of all radiological studies have been reviewed by me.    COURSE & MEDICAL DECISION MAKING  Nursing notes, VS, PMSFHx reviewed in chart.    8:14 PM Patient seen and examined at bedside.  Noted to have significant headache with blood pressure systolic 200.  This is quite concerning for postpartum preeclampsia and would meet criteria with just the blood pressure and her headache, we will initiate treatment with labetalol, magnesium, have ordered labs, CT.  Paged will be, I do anticipate transfer to the main for further care    8:37 PM  Discussed case with Dr. Yoon from OB.  Recommends 4 g IV mag bolus, continued treatment of blood pressure, directly to labor and delivery at the main    9:11 PM  Patient reevaluated upon return from CT.  Blood pressure 180/90, additional labetalol ordered, magnesium infusing at the time of EMS transport    9:34 PM  Blood pressure 164/88,patient to transport    The total critical care time spent on this patient was 40 minutes, resuscitating patient, speaking with admitting physician, and interpreting test results. This 40 minutes is exclusive of separately billable procedures.        Decision Making:  This is a 38 y.o. female  presenting with headache and elevated blood pressure.  Symptoms are consistent with postpartum preeclampsia and she is transferred emergently to OB/GYN at our main hospital for further care.  He was started on 4 g magnesium bolus, given labetalol for her blood pressure with improvement.  CT was obtained without intracranial abnormality, bleed mass or other and symptoms are likely from her hypertension and preeclampsia.    Patient is transferred in guarded condition    FINAL IMPRESSION  1. Preeclampsia in postpartum period         The note accurately reflects work and decisions made by me.  Harinder Wright  3/20/2019  9:36 PM

## 2019-03-21 NOTE — CARE PLAN
Problem: Risk for Deep Vein Thrombosis/Venous Thromboembolism  Goal: DVT/VTE Prevention Measures in Place    Intervention: Intermittent sequential compression device in place per MD order  SCD's in place for DVT prophylaxis      Problem: Cardiac Output  Goal: Patient will remain normotensive throughout hospitalization    Intervention: Administer antihypertensive drug as ordered by MD  Pt receiving oral labetalol BID, and also has the hypertensive protocol in place for breakthrough elevated BP's

## 2019-03-21 NOTE — PROGRESS NOTES
s/p delivery on magnesium    07 - Report received from ANITA Martinez RN. POC discussed, care assumed.   07 - pt noted to have SpO2 84-85% on room air. O2 started at 2L via nasal cannula. Lungs auscultated to be clear, but diminished at the bases. Pt encouraged to deep breath and cough. DTR's 1+, no clonus, pt reports headache now at 1/10, denies blurred vision or epigastric pain.  Full Assessment complete. VSS. Pt to pump at this time. POC discussed with pt, all questions answered.   0935 - Dr. Martin updated on pt status. MD at bedside. Exam performed and decision made to do spiral CT to rule out PE due to increased O2 needs. Pt verbalized agreement with POC  0950 - consent signed for IV contrast. 18g IV started in R AC for IV contrast on 1st attempt. Transport notified.  1045 - Transport at bedside to take pt to CT.  1127 - pt back to bed from CT. VS WNL, although BP more elevated, will monitor. Pt medicated for HA 7/10.  1213 - BP back within normal range. Pt still hasn't received relief from headache. Lactation consulted regarding safety of breastfeeding/pumping after receiving IV contrast. Will call back.  1231 - pt reports feeling dizzy, reports that Dr. Martin removed oxygen to trial off O2. BP WNL and O@ 93-94% on room air.   1248 - SpO2 at 86% on room air. Pt restarted on 2L via nasal cannula. Dr. Martin notified.  1258 - Kellen from Lab called with critical result of Magnesium at 5.3. Critical lab result read back to Kellen.   This critical lab result is within parameters established by  for this patient  1300 - lactation at bedside.   1400 - pt reports that headache is 3/10. Reports good breastfeeding with infant and lactation. No other needs  1500 - Dr. Martin updated. Will consult with Internal Medicine.    - Dr. Veras at bedside for internal medicine consult. Updated on pt status. Orders received for chest x-ray and echo.   1645 - Dr. Martin updated on IM consult recommendations. No  new orders at this time  1700 - pt given incentive spirometer. Educated on use and frequency of use. Performed effectively to 2200 of volume. Updated on POC, questions answered.  1830 - pt resting in bed. Reports HA at 2/10. Denies other complaints.  1900 - Report given to ANITA Martinez RN. POC discussed

## 2019-03-21 NOTE — PROGRESS NOTES
"37yo pt  Arrived via [facility]  In stable condition. 2220 Spoke to Dr. Yoon, Labs discussed, to repeat am LABS, discussed most recent BP's . POC discussed. Orders received    2300: Pumped with Breast pump.    2351: Spoke to Dr. Yoon, notified of BP of  200/74 and 174/80. Orders received for Hydralazine 10 mg    0019: MD to come evaluate pt in person.    0020: Dr. Yoon at bedside orders received. BP's reviewed.    0040: Flynn placed, tolerated well.    0500:  Pt c/o a worse HA that is located across her forehead and down the back of her neck.    0511: Medicated for HA and Nausea, lights remain dim in room and cool towel placed on pt forehead and neck. Pt state, \" Its like a contraction pain\". Denies Blurred vision, denies light sensitivity.      0645: Spoke to Dr. Yoon, concerning pt continued HA. Order received for Morphine.    0647: Spoke with Edy in Pharmacy concerning pt allergy to Codeine and Morphine administration, per Pharmacist, ok to administrate.    0700: Report given to MILTON Cheng RN.       "

## 2019-03-21 NOTE — CONSULTS
Internal Medicine Consult    Requested by :     Date of Consult : 3/21/2019    Reason for consult : Hypoxia    [facility] IM Attending : Dr. Steele    CC: Hypoxia    HPI:    Ms. Briseno is a 38 year old  who is 6-days post-partum after a NVD and a first degree perineal tear repair was admitted overnight for pre-eclampsia. She was diagnosed with gestational hypertension two weeks prior to her delivery, and had induction of labor at 37 weeks gestation, was an in-patient for this 3/14 - 3/16. She was doing well until yesterday when she noted neck pain in the morning followed by gradually worsening headache in the evening, attended ER at [facility] for BP check and was found to have a SBP > 200. CT Head did not reveal any intracranial abnormality. She was treated with IV labetolol and magnesium, was transferred to [facility]. She then had hydralazine and magnesium and her BP today has been fairly well controlled between 120-160 systolic. She had another episode of fairly rapid headache this morning, was given morphine.     She was noted to have hypoxia down to low 80s on one occasion last night and few occasions this morning, needing 2 lts of oxygen to maintain sats ~ 95-96%. She denies cough, chest pain, dyspnea, fever/chills, visual disturbances, abdominal pain. She does have some feet swelling related to her pre-eclampsia. There is no record of sats during her prior hospitalization / delivery episode.     All other ROS negative    Constitutional: Patient has had no fevers or chills or fatigue. Patient has has no significant weight change.  Eyes: Patient had no visual changes or vision loss.  ENT: Patient denies any sore throat or allergic rhinitis.  Respiratory: Patient has had no cough or sputum production. Also, no hemoptysis.  Cardiovascular: No chest pain. No SOB, orthopnea or PND. Intermittent palpitations +  GI: Patient denies any nausea, vomiting, or diarrhea. Patient has had no hematemesis or melena.  :  Patient denies any urinary urgency, frequency, or dysuria. Patient has noted no hematuria. Denies h/o polycystic kidneys  Orthopedic: Patient has no particular problem with arthritis. Patient is able to walk and exercise without difficulty.  Neurologic: Patient has had no focal numbness or weakness. HEADACHE +  Psychiatric: Patient denies any difficulty with anxiety or depression.  Endocrine: Patient denies any history of thyroid disorder or diabetes. Patient denies any heat or cold intolerance. In addition, patient denies any polydipsia or polyuria.  Hematologic: Patient has had no easy bruising or bleeding. Patient denies any history of anemia.  Skin: Patient denies any unusual rashes or skin lesions.  Allergic/immunologic: Patient denies any difficulty with hayfever or other environmental allergens. Patient denies any food allergies.      PMHx :  Gestational hypertension - past 2-3 weeks and currently pre-eclampsia  Morbid obesity, BMI 41  Vitamin D deficiency  Delayed gastric emptying  Iron deficiency anemia  Impaired fasting glucose  PSHx:  Nil    SOCIAL Hx :  Tobacco - denies  EtOH - rare / social, none during pregnancy  llicits - denies    FAMILY Hx:  Father had multiple medical issues - HTN, DM, heart problems, CHF, EtOH abuse, schizophrenia, BPD  Maternal aunt - DM    ALLERGIES :  Intolerance to codeine, not allergy    MEDICATIONS:    Current Facility-Administered Medications:   •  hydrALAZINE (APRESOLINE) injection 5-10 mg, 5-10 mg, Intravenous, PRN **OR** labetalol (NORMODYNE,TRANDATE) injection 20-40 mg, 20-40 mg, Intravenous, PRN, Ivone Yoon M.D., 20 mg at 03/21/19 0050  •  oxyCODONE-acetaminophen (PERCOCET) 5-325 MG per tablet 1-2 Tab, 1-2 Tab, Oral, Q4HRS PRN, Ivone Yoon M.D., 2 Tab at 03/21/19 1131  •  ondansetron (ZOFRAN) syringe/vial injection 4 mg, 4 mg, Intravenous, Q4HRS PRN, Ivone Yoon M.D., 4 mg at 03/21/19 0511  •  labetalol (NORMODYNE) tablet 100 mg, 100 mg, Oral, BID,  "Ivone Yoon M.D., 100 mg at 03/21/19 0511  •  lactated ringers infusion, 1,000 mL, Intravenous, Continuous, Ivone Yoon M.D., Last Rate: 75 mL/hr at 03/21/19 1029, 1,000 mL at 03/21/19 1029  •  magnesium sulfate 40 g/1000mL infusion, 2 g/hr, Intravenous, Continuous, Ivone Yoon M.D., Last Rate: 50 mL/hr at 03/21/19 1554, 2 g/hr at 03/21/19 1554  •  acetaminophen (TYLENOL) tablet 650 mg, 650 mg, Oral, Q4HRS PRN, Ivone Yoon M.D., 650 mg at 03/20/19 2236        PHYSICAL EXAM:    /77   Pulse 75   Temp 36 °C (96.8 °F) (Temporal)   Resp 16   Ht 1.676 m (5' 6\")   Wt 116 kg (255 lb 11.7 oz)   SpO2 96%   Breastfeeding? Yes   BMI 41.28 kg/m²      General: Patient is an alert, morbidly obese female, in no acute distress.  Eyes: PERRLA, EOMI, lids and conjunctiva are clear.  ENT: Mouth- good oral hygiene, oral mucosa is normal.  Neck: No thyromegaly, cervical adenopathy, or jugular venous distention is noted.  Chest: The lungs are clear to auscultation and percussion. No rales, wheezes, or rhonchi are noted. Respiratory effort is normal.  Cardiac: PMI not displaced to palpation. S1 and S2 heard. No splitting. Prominent S2. No S3 or S4 appreciated. No murmurs,clicks or rubs are noted. Carotid, femoral, and pedal pulses are 2+ and symmetrical. No carotid or femoral bruits are appreciated.  Abdomen: Abdomen is soft and nontender, no hepatosplenomegaly or masses are noted. The abdominal  aorta does not appear to be enlarged. The abdominal aorta is non-palpable. No abdominal bruits are appreciated.  Musculoskeletal: There is no significant kyphoscoliosis present. Muscle strength and tone are normal. Gait is normal.  Extremities: No clubbing, cyanosis, or edema is present.  Skin: Inspection and palpation are noncontributory.  Neurologic: No focal neurologic abnormality noted.  Psychiatric: The patient is alert and oriented x3. Mood and affect are appropriate.       Lab Results   Component Value " Date/Time    CHOLSTRLTOT 180 2013 11:43 AM    CHOLSTRLTOT 211 (H) 2012 10:28 AM     (H) 2013 11:43 AM     (H) 2012 10:28 AM    HDL 51 2013 11:43 AM    HDL 56 2012 10:28 AM    TRIGLYCERIDE 108 2013 11:43 AM    TRIGLYCERIDE 141 2012 10:28 AM       Lab Results   Component Value Date/Time    SODIUM 135 2019 04:46 AM    POTASSIUM 3.7 2019 04:46 AM    CHLORIDE 101 2019 04:46 AM    CO2 23 2019 04:46 AM    GLUCOSE 116 (H) 2019 04:46 AM    BUN 13 2019 04:46 AM    CREATININE 0.73 2019 04:46 AM    CREATININE 0.78 2012 10:28 AM    BUNCREATRAT 18 09/10/2014 08:26 AM    BUNCREATRAT 22 (H) 2012 10:28 AM    GLOMRATE >59 2011 03:15 PM     Lab Results   Component Value Date/Time    ALKPHOSPHAT 60 2019 04:46 AM    ASTSGOT 18 2019 04:46 AM    ALTSGPT 30 2019 04:46 AM    TBILIRUBIN 0.2 2019 04:46 AM      No results found for: BNPBTYPENAT    Patient Active Problem List    Diagnosis Date Noted   •  (spontaneous vaginal delivery) 2019     Priority: High   • Pain related to vaginal delivery 2019     Priority: High   • Mastalgia in female 2016   • Preventative health care 2014   • Fear of flying 2014   • Impaired fasting glucose 2012   • Vitamin D insufficiency 2011   • Delayed gastric emptying 2011     ASSESSMENT :     Ms. Briseno is a 38 year old morbidly obese female (BMI > 40), who is 6 days post-partum complicated by gestational HTN and currently admitted for postpartum preclampsia, was noted to be hypoxic this admission. She was hypoxic down to low 80s last night and again this morning, feels generally unwell (probably related to magnesium infusions). CT Head for headache did not reveal any acute intracranial abnormality. She had a CTPA to r/o PE and there was no evidence of sub-massive or massive PE noted on CTPA (I reviewed the images myself  as well as correlated with radiology report). There was no evidence of pulmonary infiltrates seen, thus excluding an infectious process. Fluid overload could account for the hypoxia as well given her pre-eclampsia, though not seen on CTPA, this may not be the best imaging modality for volume overload. There is no evidence of pleural effusion noted on CTPA either. Other differentials include primary pulmonary hypertension versus restrictive lung disease with preserved A-a gradient due to morbid obesity itself, both of these are chronic issues. She has not been symptomatic with hypoxia, no prior recordings of oxygen saturations to check if hypoxia is a new problem or chronic, and it is likely that she possibly has chronic hypoxia.     PLAN:    # Echocardiogram  # CXR  # Low salt diet  # Advise weight loss programs  # Continue management of pre-eclampsia per primary team  # She might need PFTs and pulmonology consult as out-patient    We will follow once the echocardiogram is resulted. Please contact us should you have any further queries.     Thank you for involving us in the care of this pleasant lady.

## 2019-03-21 NOTE — H&P
DATE OF ADMISSION:  03/20/2019    IDENTIFICATION:  This is a 38-year-old female, postpartum day #6, who is   admitted with severe preeclampsia.    HISTORY OF PRESENT ILLNESS:  Patient had a pregnancy complicated by   gestational hypertension that came on around 35 weeks.  She was ultimately   admitted and delivered at 37 weeks.  At that time, she had no neurological   evidence or laboratory data consistent with preeclampsia or severe   preeclampsia.  Once she delivered, her blood pressures were never in the   severe range.  She went home on postpartum day #1.  She was in her usual state   of health and progressing nicely until today.  She got a severe headache this   afternoon.  She laid down for a couple of hours, but the headache got worse.    She went to [facility].  Blood pressure there was 200 over one   teens.  An IV was started and labs were checked.  I was called in the meantime   by the emergency room doctor and we decided to place the patient on magnesium   for transport and she was transported here to the hospital.  On initial   evaluation here, her blood pressures were 200/74 and 174/80.  She had already   received 2 doses of 20 mg of IV labetalol as I ordered 10 mg of hydralazine   and 100 mg p.o. labetalol to be given.  Patient is continuing to have a   headache.  We continued magnesium infusion at 2 g an hour, but a magnesium   level came back at 2.9 and her uric acid just came back at 8.  Her creatinine   is 0.8.  Her AST and ALT are normal and platelet count is normal also.    PAST MEDICAL HISTORY:  She denies.    PAST SURGICAL HISTORY:  None.    SOCIAL HISTORY:  She is .  She is here with her .  She denies   use of tobacco, alcohol or drugs.    ALLERGIES:  She has no known drug allergies.    MEDICATIONS:  She takes no medications on a regular basis.    PHYSICAL EXAMINATION:  VITAL SIGNS:  Blood pressures as above, most recent one was 163/78.  She is   afebrile.  Her pulse is  81.  GENERAL:  She appears to be in pain from her headache.  ABDOMEN:  Soft.  I cannot even palpate her fundus.  There is minimal bleeding   on the perineum.  EXTREMITIES:  She has sequential compression devices in place and they are   activated.  She has 2+ DTRs, no clonus.    ASSESSMENT:  A 38-year-old female who is postpartum day #6, status post   vaginal delivery, which was complicated by gestational hypertension, who is   now being admitted with severe preeclampsia.  We are proceeding down the OB   hypertensive protocol and I will give her a bolus of 2 g of magnesium at this   time to see if we can get her magnesium level therapeutic.  We will follow her   labs.  I expect a 48-72-hour admission to get her blood pressures under   control.       ____________________________________     MD JEFF Chavira / NTS    DD:  03/21/2019 00:53:22  DT:  03/21/2019 02:47:29    D#:  [id number]  Job#:  [id number]    cc: PARAG MCINTYRE MD

## 2019-03-21 NOTE — ED NOTES
Pt transported via [facility] to [facility].   Pt's BP improved. Pt denies significant reduction in HA. Remains AAOx4.   ERP Dr. Wright approved [facility] to infuse 4mg magnesium during transport with their own pumps.

## 2019-03-22 ENCOUNTER — APPOINTMENT (OUTPATIENT)
Dept: CARDIOLOGY | Facility: MEDICAL CENTER | Age: 39
DRG: 776 | End: 2019-03-22
Attending: STUDENT IN AN ORGANIZED HEALTH CARE EDUCATION/TRAINING PROGRAM
Payer: COMMERCIAL

## 2019-03-22 PROBLEM — O16.3 HYPERTENSION AFFECTING PREGNANCY, THIRD TRIMESTER: Status: ACTIVE | Noted: 2019-03-22

## 2019-03-22 LAB
LV EJECT FRACT  99904: 65
LV EJECT FRACT MOD 2C 99903: 60.28
LV EJECT FRACT MOD 4C 99902: 61.9
LV EJECT FRACT MOD BP 99901: 58.35
MAGNESIUM SERPL-MCNC: 6.1 MG/DL (ref 1.5–2.5)

## 2019-03-22 PROCEDURE — A9270 NON-COVERED ITEM OR SERVICE: HCPCS | Performed by: OBSTETRICS & GYNECOLOGY

## 2019-03-22 PROCEDURE — 83735 ASSAY OF MAGNESIUM: CPT

## 2019-03-22 PROCEDURE — 700105 HCHG RX REV CODE 258: Performed by: OBSTETRICS & GYNECOLOGY

## 2019-03-22 PROCEDURE — 93306 TTE W/DOPPLER COMPLETE: CPT

## 2019-03-22 PROCEDURE — 700102 HCHG RX REV CODE 250 W/ 637 OVERRIDE(OP): Performed by: OBSTETRICS & GYNECOLOGY

## 2019-03-22 PROCEDURE — 93306 TTE W/DOPPLER COMPLETE: CPT | Mod: 26 | Performed by: INTERNAL MEDICINE

## 2019-03-22 PROCEDURE — 36415 COLL VENOUS BLD VENIPUNCTURE: CPT

## 2019-03-22 PROCEDURE — 770002 HCHG ROOM/CARE - OB PRIVATE (112)

## 2019-03-22 PROCEDURE — 99232 SBSQ HOSP IP/OBS MODERATE 35: CPT | Mod: GC | Performed by: HOSPITALIST

## 2019-03-22 RX ORDER — IBUPROFEN 600 MG/1
600 TABLET ORAL EVERY 6 HOURS PRN
Status: DISCONTINUED | OUTPATIENT
Start: 2019-03-22 | End: 2019-03-23 | Stop reason: HOSPADM

## 2019-03-22 RX ORDER — VITAMIN A ACETATE, BETA CAROTENE, ASCORBIC ACID, CHOLECALCIFEROL, .ALPHA.-TOCOPHEROL ACETATE, DL-, THIAMINE MONONITRATE, RIBOFLAVIN, NIACINAMIDE, PYRIDOXINE HYDROCHLORIDE, FOLIC ACID, CYANOCOBALAMIN, CALCIUM CARBONATE, FERROUS FUMARATE, ZINC OXIDE, CUPRIC OXIDE 3080; 12; 120; 400; 1; 1.84; 3; 20; 22; 920; 25; 200; 27; 10; 2 [IU]/1; UG/1; MG/1; [IU]/1; MG/1; MG/1; MG/1; MG/1; MG/1; [IU]/1; MG/1; MG/1; MG/1; MG/1; MG/1
1 TABLET, FILM COATED ORAL EVERY MORNING
Status: DISCONTINUED | OUTPATIENT
Start: 2019-03-22 | End: 2019-03-23 | Stop reason: HOSPADM

## 2019-03-22 RX ORDER — CYCLOBENZAPRINE HCL 10 MG
10 TABLET ORAL 3 TIMES DAILY PRN
Status: DISCONTINUED | OUTPATIENT
Start: 2019-03-22 | End: 2019-03-23 | Stop reason: HOSPADM

## 2019-03-22 RX ADMIN — LABETALOL HYDROCHLORIDE 100 MG: 100 TABLET, FILM COATED ORAL at 18:03

## 2019-03-22 RX ADMIN — IBUPROFEN 600 MG: 600 TABLET ORAL at 14:59

## 2019-03-22 RX ADMIN — Medication 1 TABLET: at 10:49

## 2019-03-22 RX ADMIN — OXYCODONE AND ACETAMINOPHEN 1 TABLET: 5; 325 TABLET ORAL at 05:55

## 2019-03-22 RX ADMIN — LABETALOL HYDROCHLORIDE 100 MG: 100 TABLET, FILM COATED ORAL at 05:55

## 2019-03-22 RX ADMIN — SODIUM CHLORIDE, POTASSIUM CHLORIDE, SODIUM LACTATE AND CALCIUM CHLORIDE 1000 ML: 600; 310; 30; 20 INJECTION, SOLUTION INTRAVENOUS at 00:45

## 2019-03-22 ASSESSMENT — PATIENT HEALTH QUESTIONNAIRE - PHQ9
SUM OF ALL RESPONSES TO PHQ9 QUESTIONS 1 AND 2: 0
2. FEELING DOWN, DEPRESSED, IRRITABLE, OR HOPELESS: NOT AT ALL
1. LITTLE INTEREST OR PLEASURE IN DOING THINGS: NOT AT ALL

## 2019-03-22 NOTE — PROGRESS NOTES
"1900: Report received from MILTON Cheng, RN. POC discussed.    1925: Pt denies HA, Blurred vision, denies pain, states \" I feel pretty good right now\". Pt resting at this time.     1930: Received Magnesium level 5.8 from alisson in the lab.    2100: Pt updated concerning CXR, verbalizes understanding.    2130: Pumping at this time.    2200:C/o left side pain on back of neck that radiates up the neck into her head. 2208: See MAR    2215: Spoke to Dr. Martin, updated, pt c/o neck pain. Order received for Flexeril if pt needs.    2225: C/o ear ache. Pt updated on POC. Encouraged to rest. Discussed pt concerns with care, pt stated that she had spoke with her  concerning being under the care of a resident.     2242: Pt at bedside requesting to speak to Charge RN regarding pt care.    2244: RODNEY Molina RN at bedside to discuss concerns with pt and . No further concerns at this time.    2257: Pt transferred via wheelchair for XRAY.    0050: Magnesium level of 6.1 reported by Maria from lab.    0330: Pt c/o pain in lower abd. That is tolerable.    0425: Magnesium infusion D/c per Dr. Martin, updated on BP's. Pt updated on POD, verbalizes understanding.    0600: Pt breastfeeding baby at this time. C/o 3/10 pain in neck that is tolerable. Offered Flexeril  If needed. Pt would like to try heat pack. Updated on POC,verbalizes understanding.    0630: Gave heat pack, encouraged I/s while awake.    0645: Dr. Martin at bedside to talk with pt.    0700: Report given to MILTON Cheng, RN.  "

## 2019-03-22 NOTE — ASSESSMENT & PLAN NOTE
- Patient 6 days post-partum complicated by gestational HTN and currently admitted for postpartum preclampsia, was noted to be hypoxic this admissionto low 80s   - Multiple magnesium infusions.   - CT Head for headache did not reveal any acute intracranial abnormality. - - She had a CTPA to r/o PE and there was no evidence of sub-massive or massive PE noted on CTPA   - Other differentials include primary pulmonary hypertension versus restrictive lung disease with preserved A-a gradient due to morbid obesity itself, both of these are chronic issues.   - CXR with Minimal left basilar atelectasis.No acute cardiopulmonary findings  PLAN:     - Transthoracic Echocardiogram pending  - Incentive Spirometry  - Low salt diet  - Advise weight loss programs  - Continue management of pre-eclampsia per primary team  - Will setup close follow-up with PFTs and pulmonology consult as out-patient if needed pending Echo read.  - Thank you for this interesting consult.

## 2019-03-22 NOTE — CARE PLAN
Problem: Risk for Deep Vein Thrombosis/Venous Thromboembolism  Goal: DVT/VTE Prevention Measures in Place    Intervention: Intermittent sequential compression device in place per MD order  SCD's in place      Problem: Cardiac Output  Goal: Patient will remain normotensive throughout hospitalization    Intervention: Administer antihypertensive drug as ordered by MD  Pt receiving labetalol BID for hypertension

## 2019-03-22 NOTE — PROGRESS NOTES
" s/p delivery off magnesium this morning    0700 - Report received from ANITA Martinez RN. POC discussed, care assumed.   07 -  Full Assessment complete. VSS. No complaints of severe HA, just left neck pain, no epigastric pain or visual changes. DTRs 1+, no clonus.POC discussed with pt and family members, all questions answered.   0745 - Dr. Rodriguez (internal medicine) at bedside. Update given. No new orders at this time.   0830 - benitez catheter removed. Leigh care provided with new pads.  0947 - pt sitting up at bedside. BP WNL. Pt denies dizziness, SOB. Ambulated to bathroom. Voided large amount. pericare provided with new pads.  0953 - post void/ambulation BP WNL. Pt dressed in personal clothing  1011 - standing BP post ambulating in messer with steady gait, FOB and infant in crib, accompanied by RN. Pt denies SOB or dizzyness. Requesting change in pain medication to ibuprofen. Dr. Moore called, orders received.  1215 - pt doing well off oxygen, a few \"touchdowns\" in the 80's noted, pt reports BF at this time in a sidelying position and repositioned to high ujan position and this resolved.  1345 - Echo at bedside  1545 - pt up to shower. IV's covered. Linens changed. Pt declines assistance with shower.   1715 - pt resting in bed. No needs or complaints at this time  1900 - report given to ZENOBIA Kendrick RN. POC discussed  "

## 2019-03-23 ENCOUNTER — HOSPITAL ENCOUNTER (EMERGENCY)
Facility: MEDICAL CENTER | Age: 39
End: 2019-03-24
Attending: EMERGENCY MEDICINE
Payer: COMMERCIAL

## 2019-03-23 VITALS
OXYGEN SATURATION: 94 % | DIASTOLIC BLOOD PRESSURE: 70 MMHG | TEMPERATURE: 96.8 F | RESPIRATION RATE: 16 BRPM | WEIGHT: 255.73 LBS | HEIGHT: 66 IN | BODY MASS INDEX: 41.1 KG/M2 | HEART RATE: 88 BPM | SYSTOLIC BLOOD PRESSURE: 140 MMHG

## 2019-03-23 DIAGNOSIS — N39.0 ACUTE UTI: ICD-10-CM

## 2019-03-23 PROCEDURE — A9270 NON-COVERED ITEM OR SERVICE: HCPCS | Performed by: OBSTETRICS & GYNECOLOGY

## 2019-03-23 PROCEDURE — 99285 EMERGENCY DEPT VISIT HI MDM: CPT

## 2019-03-23 PROCEDURE — 700102 HCHG RX REV CODE 250 W/ 637 OVERRIDE(OP): Performed by: OBSTETRICS & GYNECOLOGY

## 2019-03-23 RX ORDER — HYDRALAZINE HYDROCHLORIDE 20 MG/ML
10 INJECTION INTRAMUSCULAR; INTRAVENOUS ONCE
Status: COMPLETED | OUTPATIENT
Start: 2019-03-24 | End: 2019-03-24

## 2019-03-23 RX ORDER — LABETALOL 100 MG/1
100 TABLET, FILM COATED ORAL 2 TIMES DAILY
Qty: 60 TAB | Refills: 1 | Status: SHIPPED | OUTPATIENT
Start: 2019-03-23 | End: 2019-03-24

## 2019-03-23 RX ADMIN — Medication 1 TABLET: at 06:33

## 2019-03-23 RX ADMIN — LABETALOL HYDROCHLORIDE 100 MG: 100 TABLET, FILM COATED ORAL at 06:34

## 2019-03-23 NOTE — PROGRESS NOTES
Progress Note    Patient was admitted for postpartum atypical preeclampsia.  Her headache was resolved, her blood pressure is controlled on 100 mg twice daily of labetalol.  Since we turned off the magnesium sulfate she has been pulse oxing over 90 almost all the time.  She feels better        Subjective: Patient is doing well lochia is normal.    Objective: Vital signs are normal  General: Patient's awake alert pleasant  Head: Atraumatic normocephalic  Abdomen: Soft    Assessment:  Postpartum day #7  Postpartum preeclampsia    Plan:  Echocardiogram appears to be normal.  If blood pressures are normal and patient's pulse oxing normally, we will discharge her tomorrow on labetalol     Queta Moore MD

## 2019-03-23 NOTE — PROGRESS NOTES
0700 - Report received from ZENOBIA Kendrick RN. Pt states she is feeling well this morning. Denies HA, visual changes, RUQ pain, increased swelling, difficulty breathing, or any other symptoms. Dr. Moore at bedside.   0800 - Per MD, pt may be discharged to home  0920 - D/C instructions discussed including PP instructions, and hypertension precautions. Instructions given for pt to take home. Questions answered.   1015 - Pt ambulated off unit in stable condition with FOb at side with belongings and infant, discharged to home.

## 2019-03-23 NOTE — PROGRESS NOTES
7:33 PM           Progress Note     Patient was admitted for postpartum atypical preeclampsia.  Her headache was resolved, her blood pressure is controlled on 100 mg twice daily of labetalol.  Since we turned off the magnesium sulfate she has been pulse oxing over 90           Subjective: Patient is doing well lochia is normal.     Objective: Blood pressure is normal to slightly elevated  General: Patient's awake alert pleasant  Head: Atraumatic normocephalic  Abdomen: Soft  Dtr 2+     Assessment:    Postpartum day #8  Postpartum preeclampsia  Hospital day 3     Plan:  Echocardiogram appears to be normal.  Discharge home on labetalol   Follow up in office next week     Queta Moore MD

## 2019-03-23 NOTE — DISCHARGE INSTRUCTIONS
Postpartum Hypertension  Postpartum hypertension is high blood pressure after pregnancy that remains higher than normal for more than two days after delivery. You may not realize that you have postpartum hypertension if your blood pressure is not being checked regularly. In some cases, postpartum hypertension will go away on its own, usually within a week of delivery. However, for some women, medical treatment is required to prevent serious complications, such as seizures or stroke.  The following things can affect your blood pressure:  · The type of delivery you had.  · Having received IV fluids or other medicines during or after delivery.  What are the causes?  Postpartum hypertension may be caused by any of the following or by a combination of any of the following:  · Hypertension that existed before pregnancy (chronic hypertension).  · Gestational hypertension.  · Preeclampsia or eclampsia.  · Receiving a lot of fluid through an IV during or after delivery.  · Medicines.  · HELLP syndrome.  · Hyperthyroidism.  · Stroke.  · Other rare neurological or blood disorders.  In some cases, the cause may not be known.  What increases the risk?  Postpartum hypertension can be related to one or more risk factors, such as:  · Chronic hypertension. In some cases, this may not have been diagnosed before pregnancy.  · Obesity.  · Type 2 diabetes.  · Kidney disease.  · Family history of preeclampsia.  · Other medical conditions that cause hormonal imbalances.  What are the signs or symptoms?  As with all types of hypertension, postpartum hypertension may not have any symptoms. Depending on how high your blood pressure is, you may experience:  · Headaches. These may be mild, moderate, or severe. They may also be steady, constant, or sudden in onset (thunderclap headache).  · Visual changes.  · Dizziness.  · Shortness of breath.  · Swelling of your hands, feet, lower legs, or face. In some cases, you may have swelling in more  than one of these locations.  · Heart palpitations or a racing heartbeat.  · Difficulty breathing while lying down.  · Decreased urination.  Other rare signs and symptoms may include:  · Sweating more than usual. This lasts longer than a few days after delivery.  · Chest pain.  · Sudden dizziness when you get up from sitting or lying down.  · Seizures.  · Nausea or vomiting.  · Abdominal pain.  How is this diagnosed?  The diagnosis of postpartum hypertension is made through a combination of physical examination findings and testing of your blood and urine. You may also have additional tests, such as a CT scan or an MRI, to check for other complications of postpartum hypertension.  How is this treated?  When blood pressure is high enough to require treatment, your options may include:  · Medicines to reduce blood pressure (antihypertensives). Tell your health care provider if you are breastfeeding or if you plan to breastfeed. There are many antihypertensive medicines that are safe to take while breastfeeding.  · Stopping medicines that may be causing hypertension.  · Treating medical conditions that are causing hypertension.  · Treating the complications of hypertension, such as seizures, stroke, or kidney problems.  Your health care provider will also continue to monitor your blood pressure closely and repeatedly until it is within a safe range for you.  Follow these instructions at home:  · Take medicines only as directed by your health care provider.  · Get regular exercise after your health care provider tells you that it is safe.  · Follow your health care provider’s recommendations on fluid and salt restrictions.  · Do not use any tobacco products, including cigarettes, chewing tobacco, or electronic cigarettes. If you need help quitting, ask your health care provider.  · Keep all follow-up visits as directed by your health care provider. This is important.  Contact a health care provider if:  · Your symptoms  get worse.  · You have new symptoms, such as:  ¨ Headache.  ¨ Dizziness.  ¨ Visual changes.  Get help right away if:  · You develop a severe or sudden headache.  · You have seizures.  · You develop numbness or weakness on one side of your body.  · You have difficulty thinking, speaking, or swallowing.  · You develop severe abdominal pain.  · You develop difficulty breathing, chest pain, a racing heartbeat, or heart palpitations.  These symptoms may represent a serious problem that is an emergency. Do not wait to see if the symptoms will go away. Get medical help right away. Call your local emergency services (911 in the [place] Do not drive yourself to the hospital.   This information is not intended to replace advice given to you by your health care provider. Make sure you discuss any questions you have with your health care provider.  Document Released: 2015 Document Revised: 2017 Document Reviewed: 2015  [employer] Interactive Patient Education © 2017 [employer]  Postpartum Care After Vaginal Delivery  The period of time right after you deliver your  is called the postpartum period.  What kind of medical care will I receive?  · You may continue to receive fluids and medicines through an IV tube inserted into one of your veins.  · If an incision was made near your vagina (episiotomy) or if you had some vaginal tearing during delivery, cold compresses may be placed on your episiotomy or your tear. This helps to reduce pain and swelling.  · You may be given a squirt bottle to use when you go to the bathroom. You may use this until you are comfortable wiping as usual. To use the squirt bottle, follow these steps:  ¨ Before you urinate, fill the squirt bottle with warm water. Do not use hot water.  ¨ After you urinate, while you are sitting on the toilet, use the squirt bottle to rinse the area around your urethra and vaginal opening. This rinses away any urine and blood.  ¨ You may do this  instead of wiping. As you start healing, you may use the squirt bottle before wiping yourself. Make sure to wipe gently.  ¨ Fill the squirt bottle with clean water every time you use the bathroom.  · You will be given sanitary pads to wear.  How can I expect to feel?  · You may not feel the need to urinate for several hours after delivery.  · You will have some soreness and pain in your abdomen and vagina.  · If you are breastfeeding, you may have uterine contractions every time you breastfeed for up to several weeks postpartum. Uterine contractions help your uterus return to its normal size.  · It is normal to have vaginal bleeding (lochia) after delivery. The amount and appearance of lochia is often similar to a menstrual period in the first week after delivery. It will gradually decrease over the next few weeks to a dry, yellow-brown discharge. For most women, lochia stops completely by 6-8 weeks after delivery. Vaginal bleeding can vary from woman to woman.  · Within the first few days after delivery, you may have breast engorgement. This is when your breasts feel heavy, full, and uncomfortable. Your breasts may also throb and feel hard, tightly stretched, warm, and tender. After this occurs, you may have milk leaking from your breasts. Your health care provider can help you relieve discomfort due to breast engorgement. Breast engorgement should go away within a few days.  · You may feel more sad or worried than normal due to hormonal changes after delivery. These feelings should not last more than a few days. If these feelings do not go away after several days, speak with your health care provider.  How should I care for myself?  · Tell your health care provider if you have pain or discomfort.  · Drink enough water to keep your urine clear or pale yellow.  · Wash your hands thoroughly with soap and water for at least 20 seconds after changing your sanitary pads, after using the toilet, and before holding or  feeding your baby.  · If you are not breastfeeding, avoid touching your breasts a lot. Doing this can make your breasts produce more milk.  · If you become weak or lightheaded, or you feel like you might faint, ask for help before:  ¨ Getting out of bed.  ¨ Showering.  · Change your sanitary pads frequently. Watch for any changes in your flow, such as a sudden increase in volume, a change in color, the passing of large blood clots. If you pass a blood clot from your vagina, save it to show to your health care provider. Do not flush blood clots down the toilet without having your health care provider look at them.  · Make sure that all your vaccinations are up to date. This can help protect you and your baby from getting certain diseases. You may need to have immunizations done before you leave the hospital.  · If desired, talk with your health care provider about methods of family planning or birth control (contraception).  How can I start bonding with my baby?  Spending as much time as possible with your baby is very important. During this time, you and your baby can get to know each other and develop a bond. Having your baby stay with you in your room (rooming in) can give you time to get to know your baby. Rooming in can also help you become comfortable caring for your baby. Breastfeeding can also help you bond with your baby.  How can I plan for returning home with my baby?  · Make sure that you have a car seat installed in your vehicle.  ¨ Your car seat should be checked by a certified car  to make sure that it is installed safely.  ¨ Make sure that your baby fits into the car seat safely.  · Ask your health care provider any questions you have about caring for yourself or your baby. Make sure that you are able to contact your health care provider with any questions after leaving the hospital.  This information is not intended to replace advice given to you by your health care provider. Make sure  you discuss any questions you have with your health care provider.  Document Released: 10/14/2008 Document Revised: 05/22/2017 Document Reviewed: 11/21/2016  [employer] Interactive Patient Education © 2017 [employer]

## 2019-03-23 NOTE — DISCHARGE SUMMARY
DATE OF ADMISSION:  03/20/2019    DATE OF DISCHARGE:  03/23/2019    DISCHARGE DIAGNOSIS:  Postpartum preeclampsia on postpartum day #6.    HOSPITAL COURSE:  Patient is a 38-year-old female who presented on postpartum   day 6 with headache.  Her blood pressures were severely elevated as high as   200/74.  They were controlled with 2 doses of 20 mg of IV labetalol and 10 mg   of hydralazine.  She has begun magnesium.  This was kept for almost 30 hours.    Her pulse ox was low on magnesium sulfate.  Once we discontinued, her pulse   ox was returned to normal.  Her blood pressures were controlled with   labetalol.    CAT scan, chest x-ray, and echocardiogram were done because of shortness of   breath and they were all negative.    Patient was discharged home on hospital day #3.    DISCHARGE MEDICATIONS:  Labetalol 100 mg p.o. b.i.d.    DISCHARGE FOLLOWUP:  She is to follow up in my office next week.       ____________________________________     MD MARSHAL ROSE / CLARISA    DD:  03/23/2019 08:11:40  DT:  03/23/2019 10:25:36    D#:  [id number]  Job#:  [id number]

## 2019-03-23 NOTE — PROGRESS NOTES
1900-rcvd report from curtis RN and assumed care of pt.  1915-pt feeling much better. Walking around unit with family. Denies HA  0700-pt had an uneventful night. No complaints. Report given to curtis GLEASON

## 2019-03-24 VITALS
WEIGHT: 249.12 LBS | TEMPERATURE: 97 F | HEART RATE: 96 BPM | OXYGEN SATURATION: 97 % | SYSTOLIC BLOOD PRESSURE: 136 MMHG | HEIGHT: 66 IN | BODY MASS INDEX: 40.04 KG/M2 | RESPIRATION RATE: 16 BRPM | DIASTOLIC BLOOD PRESSURE: 89 MMHG

## 2019-03-24 LAB
ALBUMIN SERPL BCP-MCNC: 4.1 G/DL (ref 3.2–4.9)
ALBUMIN/GLOB SERPL: 1.3 G/DL
ALP SERPL-CCNC: 76 U/L (ref 30–99)
ALT SERPL-CCNC: 21 U/L (ref 2–50)
ANION GAP SERPL CALC-SCNC: 9 MMOL/L (ref 0–11.9)
APPEARANCE UR: ABNORMAL
AST SERPL-CCNC: 12 U/L (ref 12–45)
BACTERIA #/AREA URNS HPF: ABNORMAL /HPF
BASOPHILS # BLD AUTO: 0.3 % (ref 0–1.8)
BASOPHILS # BLD: 0.04 K/UL (ref 0–0.12)
BILIRUB SERPL-MCNC: 0.4 MG/DL (ref 0.1–1.5)
BILIRUB UR QL STRIP.AUTO: NEGATIVE
BUN SERPL-MCNC: 12 MG/DL (ref 8–22)
CALCIUM SERPL-MCNC: 8.8 MG/DL (ref 8.5–10.5)
CHLORIDE SERPL-SCNC: 106 MMOL/L (ref 96–112)
CO2 SERPL-SCNC: 21 MMOL/L (ref 20–33)
COLOR UR: YELLOW
CREAT SERPL-MCNC: 0.8 MG/DL (ref 0.5–1.4)
EKG IMPRESSION: NORMAL
EOSINOPHIL # BLD AUTO: 0.32 K/UL (ref 0–0.51)
EOSINOPHIL NFR BLD: 2.5 % (ref 0–6.9)
EPI CELLS #/AREA URNS HPF: ABNORMAL /HPF
ERYTHROCYTE [DISTWIDTH] IN BLOOD BY AUTOMATED COUNT: 49.1 FL (ref 35.9–50)
GLOBULIN SER CALC-MCNC: 3.2 G/DL (ref 1.9–3.5)
GLUCOSE SERPL-MCNC: 113 MG/DL (ref 65–99)
GLUCOSE UR STRIP.AUTO-MCNC: NEGATIVE MG/DL
HCT VFR BLD AUTO: 41 % (ref 37–47)
HGB BLD-MCNC: 13.2 G/DL (ref 12–16)
HYALINE CASTS #/AREA URNS LPF: ABNORMAL /LPF
IMM GRANULOCYTES # BLD AUTO: 0.1 K/UL (ref 0–0.11)
IMM GRANULOCYTES NFR BLD AUTO: 0.8 % (ref 0–0.9)
KETONES UR STRIP.AUTO-MCNC: NEGATIVE MG/DL
LDH SERPL L TO P-CCNC: 191 U/L (ref 107–266)
LEUKOCYTE ESTERASE UR QL STRIP.AUTO: ABNORMAL
LYMPHOCYTES # BLD AUTO: 3.55 K/UL (ref 1–4.8)
LYMPHOCYTES NFR BLD: 27.9 % (ref 22–41)
MAGNESIUM SERPL-MCNC: 2.1 MG/DL (ref 1.5–2.5)
MCH RBC QN AUTO: 29.5 PG (ref 27–33)
MCHC RBC AUTO-ENTMCNC: 32.2 G/DL (ref 33.6–35)
MCV RBC AUTO: 91.5 FL (ref 81.4–97.8)
MICRO URNS: ABNORMAL
MONOCYTES # BLD AUTO: 0.88 K/UL (ref 0–0.85)
MONOCYTES NFR BLD AUTO: 6.9 % (ref 0–13.4)
NEUTROPHILS # BLD AUTO: 7.83 K/UL (ref 2–7.15)
NEUTROPHILS NFR BLD: 61.6 % (ref 44–72)
NITRITE UR QL STRIP.AUTO: NEGATIVE
NRBC # BLD AUTO: 0 K/UL
NRBC BLD-RTO: 0 /100 WBC
PH UR STRIP.AUTO: 6 [PH]
PLATELET # BLD AUTO: 308 K/UL (ref 164–446)
PMV BLD AUTO: 8.7 FL (ref 9–12.9)
POTASSIUM SERPL-SCNC: 4.1 MMOL/L (ref 3.6–5.5)
PROT SERPL-MCNC: 7.3 G/DL (ref 6–8.2)
PROT UR QL STRIP: 30 MG/DL
RBC # BLD AUTO: 4.48 M/UL (ref 4.2–5.4)
RBC # URNS HPF: ABNORMAL /HPF
RBC UR QL AUTO: ABNORMAL
SODIUM SERPL-SCNC: 136 MMOL/L (ref 135–145)
SP GR UR STRIP.AUTO: 1.01
UROBILINOGEN UR STRIP.AUTO-MCNC: 0.2 MG/DL
WBC # BLD AUTO: 12.7 K/UL (ref 4.8–10.8)
WBC #/AREA URNS HPF: ABNORMAL /HPF

## 2019-03-24 PROCEDURE — 85025 COMPLETE CBC W/AUTO DIFF WBC: CPT

## 2019-03-24 PROCEDURE — 36415 COLL VENOUS BLD VENIPUNCTURE: CPT

## 2019-03-24 PROCEDURE — 700105 HCHG RX REV CODE 258: Performed by: EMERGENCY MEDICINE

## 2019-03-24 PROCEDURE — 80053 COMPREHEN METABOLIC PANEL: CPT

## 2019-03-24 PROCEDURE — 93005 ELECTROCARDIOGRAM TRACING: CPT | Performed by: EMERGENCY MEDICINE

## 2019-03-24 PROCEDURE — 96375 TX/PRO/DX INJ NEW DRUG ADDON: CPT

## 2019-03-24 PROCEDURE — 83735 ASSAY OF MAGNESIUM: CPT

## 2019-03-24 PROCEDURE — 700111 HCHG RX REV CODE 636 W/ 250 OVERRIDE (IP): Performed by: EMERGENCY MEDICINE

## 2019-03-24 PROCEDURE — A9270 NON-COVERED ITEM OR SERVICE: HCPCS | Performed by: EMERGENCY MEDICINE

## 2019-03-24 PROCEDURE — 96365 THER/PROPH/DIAG IV INF INIT: CPT

## 2019-03-24 PROCEDURE — 81001 URINALYSIS AUTO W/SCOPE: CPT

## 2019-03-24 PROCEDURE — 83615 LACTATE (LD) (LDH) ENZYME: CPT

## 2019-03-24 PROCEDURE — 700102 HCHG RX REV CODE 250 W/ 637 OVERRIDE(OP): Performed by: EMERGENCY MEDICINE

## 2019-03-24 RX ORDER — LABETALOL 100 MG/1
100 TABLET, FILM COATED ORAL EVERY 8 HOURS
Qty: 90 TAB | Refills: 0 | Status: SHIPPED | OUTPATIENT
Start: 2019-03-24 | End: 2019-04-23

## 2019-03-24 RX ORDER — HYDRALAZINE HYDROCHLORIDE 20 MG/ML
10 INJECTION INTRAMUSCULAR; INTRAVENOUS ONCE
Status: COMPLETED | OUTPATIENT
Start: 2019-03-24 | End: 2019-03-24

## 2019-03-24 RX ORDER — LABETALOL 100 MG/1
100 TABLET, FILM COATED ORAL ONCE
Status: COMPLETED | OUTPATIENT
Start: 2019-03-24 | End: 2019-03-24

## 2019-03-24 RX ORDER — CEFDINIR 300 MG/1
300 CAPSULE ORAL 2 TIMES DAILY
Qty: 20 CAP | Refills: 0 | Status: SHIPPED | OUTPATIENT
Start: 2019-03-24 | End: 2019-11-12

## 2019-03-24 RX ORDER — CEFDINIR 300 MG/1
300 CAPSULE ORAL 2 TIMES DAILY
Qty: 20 CAP | Refills: 0 | Status: SHIPPED | OUTPATIENT
Start: 2019-03-24 | End: 2019-03-24

## 2019-03-24 RX ADMIN — LABETALOL HYDROCHLORIDE 100 MG: 100 TABLET, FILM COATED ORAL at 01:26

## 2019-03-24 RX ADMIN — HYDRALAZINE HYDROCHLORIDE 10 MG: 20 INJECTION INTRAMUSCULAR; INTRAVENOUS at 00:53

## 2019-03-24 RX ADMIN — CEFTRIAXONE 2 G: 2 INJECTION, POWDER, FOR SOLUTION INTRAMUSCULAR; INTRAVENOUS at 02:15

## 2019-03-24 RX ADMIN — HYDRALAZINE HYDROCHLORIDE 10 MG: 20 INJECTION INTRAMUSCULAR; INTRAVENOUS at 00:24

## 2019-03-24 NOTE — ED PROVIDER NOTES
ED Provider Note    Scribed for Lloyd Morrison M.D. by Shree Munguia. 3/24/2019, 12:04 AM.    Primary care provider: Adela Morin  Means of arrival: Walk in  History obtained from: Patient  History limited by: None    CHIEF COMPLAINT  Chief Complaint   Patient presents with   • Hypertension   • Other     postpartum       HPI  Karin Briseno is a 38 y.o. female [id number] presents to the Emergency Department for evaluation of hypertension onset this morning. She is post partum after delivery of her daughter on 3/14, but states the delivery was induced due to pre-eclampsia. The patient states she has continued to have complications of pre-eclampsia following the delivery, and was seen at [facility] for a similar complaint. She was subsequently admitted by Dr. Moore, but was discharged yesterday morning as her systolic pressure was found to be in the 140's. The patient was given a prescription for Labetalol at that time, and claims she took her last dose at 7 PM tonight. She was prompted to come to the ED as she noticed her at home blood pressure cuff measured a systolic pressure in the 170's tonight. The patient currently endorses a very mild headache at this time. No alleviating or exacerbating factors are identified. She reports normal vaginal delivery with no complications. The patient states she she has only had very light vaginal bleeding following delivery. She has no seizures in the past and no known seizure disorder. The patient denies associated nausea, vomiting, chest pain, shortness of breath, or peripheral edema. She is currently breast feeding.     REVIEW OF SYSTEMS  Pertinent positives include hypertension and headache. Pertinent negatives include nausea, vomiting, chest pain, shortness of breath, or peripheral edema. All other systems negative.    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    SURGICAL HISTORY  patient denies any surgical history    SOCIAL HISTORY  Social History   Substance Use Topics   • Smoking status: Never Smoker   • Smokeless tobacco: Never Used   • Alcohol use No      Comment:  Rare      History   Drug Use No       FAMILY HISTORY  Family History   Problem Relation Age of Onset   • Cancer Father         Hx testicular   • Diabetes Father    • Heart Disease Father    • Hypertension Father    • Cancer Maternal Grandmother         MGGM Colon cancer 80s)       CURRENT MEDICATIONS  Home Medications    **Home medications have not yet been reviewed for this encounter**         ALLERGIES  Allergies   Allergen Reactions   • Codeine Vomiting       PHYSICAL EXAM  VITAL SIGNS: BP (!) 186/106   Pulse 92   Temp 36.1 °C (97 °F) (Temporal)   Resp 18   Ht 1.676 m (5' 6\")   Wt 113 kg (249 lb 1.9 oz)   SpO2 98%   BMI 40.21 kg/m²     Constitutional: Well developed, Well nourished, No distress.   HENT: Normocephalic, Atraumatic, Oropharynx moist.   Eyes: Conjunctiva normal, No discharge.   Neck: Supple, No stridor.  Cardiovascular: Normal heart rate, Normal rhythm, No murmurs, equal pulses.   Pulmonary: Normal breath sounds, No respiratory distress, No wheezing, No rales, No rhonchi.  Chest: No chest wall tenderness or deformity.   Abdomen: Obese, Soft, No tenderness, No masses, no rebound, no guarding.   Back: No CVA tenderness.   Musculoskeletal: No major deformities noted, No tenderness. 1 + pitting edema below the knees, no calf tenderness.   Skin: Warm, Dry, No erythema, No rash.   Neurologic: Alert & oriented x 3, Normal motor function,  No focal deficits noted. Equal strength in upper and lower extremities. Non hyperreflexic.   Psychiatric: Affect normal, Judgment normal, Mood normal.       LABS  Results for orders placed or performed during the hospital encounter " of 03/23/19   CBC WITH DIFFERENTIAL   Result Value Ref Range    WBC 12.7 (H) 4.8 - 10.8 K/uL    RBC 4.48 4.20 - 5.40 M/uL    Hemoglobin 13.2 12.0 - 16.0 g/dL    Hematocrit 41.0 37.0 - 47.0 %    MCV 91.5 81.4 - 97.8 fL    MCH 29.5 27.0 - 33.0 pg    MCHC 32.2 (L) 33.6 - 35.0 g/dL    RDW 49.1 35.9 - 50.0 fL    Platelet Count 308 164 - 446 K/uL    MPV 8.7 (L) 9.0 - 12.9 fL    Neutrophils-Polys 61.60 44.00 - 72.00 %    Lymphocytes 27.90 22.00 - 41.00 %    Monocytes 6.90 0.00 - 13.40 %    Eosinophils 2.50 0.00 - 6.90 %    Basophils 0.30 0.00 - 1.80 %    Immature Granulocytes 0.80 0.00 - 0.90 %    Nucleated RBC 0.00 /100 WBC    Neutrophils (Absolute) 7.83 (H) 2.00 - 7.15 K/uL    Lymphs (Absolute) 3.55 1.00 - 4.80 K/uL    Monos (Absolute) 0.88 (H) 0.00 - 0.85 K/uL    Eos (Absolute) 0.32 0.00 - 0.51 K/uL    Baso (Absolute) 0.04 0.00 - 0.12 K/uL    Immature Granulocytes (abs) 0.10 0.00 - 0.11 K/uL    NRBC (Absolute) 0.00 K/uL   COMP METABOLIC PANEL   Result Value Ref Range    Sodium 136 135 - 145 mmol/L    Potassium 4.1 3.6 - 5.5 mmol/L    Chloride 106 96 - 112 mmol/L    Co2 21 20 - 33 mmol/L    Anion Gap 9.0 0.0 - 11.9    Glucose 113 (H) 65 - 99 mg/dL    Bun 12 8 - 22 mg/dL    Creatinine 0.80 0.50 - 1.40 mg/dL    Calcium 8.8 8.5 - 10.5 mg/dL    AST(SGOT) 12 12 - 45 U/L    ALT(SGPT) 21 2 - 50 U/L    Alkaline Phosphatase 76 30 - 99 U/L    Total Bilirubin 0.4 0.1 - 1.5 mg/dL    Albumin 4.1 3.2 - 4.9 g/dL    Total Protein 7.3 6.0 - 8.2 g/dL    Globulin 3.2 1.9 - 3.5 g/dL    A-G Ratio 1.3 g/dL   MAGNESIUM   Result Value Ref Range    Magnesium 2.1 1.5 - 2.5 mg/dL   URINALYSIS (UA)   Result Value Ref Range    Color Yellow     Character Cloudy (A)     Specific Gravity 1.013 <1.035    Ph 6.0 5.0 - 8.0    Glucose Negative Negative mg/dL    Ketones Negative Negative mg/dL    Protein 30 (A) Negative mg/dL    Bilirubin Negative Negative    Urobilinogen, Urine 0.2 Negative    Nitrite Negative Negative    Leukocyte Esterase Moderate (A)  Negative    Occult Blood Moderate (A) Negative    Micro Urine Req Microscopic    LDH   Result Value Ref Range    LDH Total 191 107 - 266 U/L   ESTIMATED GFR   Result Value Ref Range    GFR If African American >60 >60 mL/min/1.73 m 2    GFR If Non African American >60 >60 mL/min/1.73 m 2   URINE MICROSCOPIC (W/UA)   Result Value Ref Range    WBC Packed (A) /hpf    RBC 20-50 (A) /hpf    Bacteria Many (A) None /hpf    Epithelial Cells Few /hpf    Hyaline Cast 0-2 /lpf   EKG (NOW)   Result Value Ref Range    Report       [facility] Emergency [facility].    Test Date:  2019  Pt Name:    ROBIN ALFRED                Department: ER  MRN:        [id number]                      Room:       [facility]  Gender:     Female                       Technician: [id number]  :        1980                   Requested By:MICAH ANTONIO  Order #:    [id number]                    Reading MD: MICAH ANTONIO MD    Measurements  Intervals                                Axis  Rate:       93                           P:          28  FL:         148                          QRS:        33  QRSD:       84                           T:          30  QT:         368  QTc:        458    Interpretive Statements  SINUS RHYTHM  Compared to ECG 2019 20:19:57  No significant changes    Electronically Signed On 3- 3:49:11 PDT by MICAH ANTONIO MD       All labs reviewed by me.    EKG  12 Lead EKG interpreted by me shows a normal sinus rhythm at a rate of 93. Axis normal. No ST elevations. No T wave inversions.  Final impression: Normal EKG.    COURSE & MEDICAL DECISION MAKING  Pertinent Labs & Imaging studies reviewed. (See chart for details)    12:04 AM - Patient seen and examined at bedside. Patient will be treated with Hydralazine 10 mg. Ordered Urinalysis, CBC with differential, CMP, Magnesium, LDH, and EKG to evaluate her symptoms. The differential diagnoses include but are not limited to: pre-eclampsia.      1:06 AM - Patient remains hypertensive with systolic pressure in the 160's. Spoke with Pharmacy who recommends giving patient additional oral dose of Labetalol as it should be Q8 not Q12.    03:30 am discussed the case with Dr. Rdz OB on call for Dr. Moore. She feels that the plan with increasing her Labetolol to three times a day would be reasonable to have her try and to continue to monitor her blood pressure at home. She wants her to see Dr. Moore on Monday or Tuesday.   03:45 Discussed this with the patient. She is agreeable to go home, and will continue to check her blood pressure a couple times a day. She was given strict return guidelines.     CRITICAL CARE  I provided critical care services, which included medication orders, frequent reevaluations of the patient's condition and response to treatment, ordering and reviewing test results, and discussing the case with various consultants.  The critical care time associated with the care of the patient was 32 minutes. Review chart for interventions. This time is exclusive of any other billable procedures.       Medical Decision Making: At this point time it appears patient does have some continued preeclampsia after delivery.  Patient was treated with hydralazine and a third dose of oral labetalol was given.  At this point time her blood pressures have come down to a safe range.  She has no signs of electrolyte abnormality or endorgan damage.  At this point time after discussion with OB/GYN it is felt that the patient can go home with 100 mg of labetalol 3 times daily.  Patient was given strict return guidelines.  Patient also appears to have a UTI was given IV Rocephin here and will be transitioned to Omnicef.     The patient will return for new or worsening symptoms and is stable at the time of discharge.    The patient is referred to a primary physician for blood pressure management, diabetic screening, and for all other preventative health  concerns.        DISPOSITION:  Patient will be discharged home in stable condition.    FOLLOW UP:  Adela Morin  [address]  [address]  [place] [id number] [phone]    Schedule an appointment as soon as possible for a visit in 3 days      Queta Moore M.D.  [address] [facility]  [address]  [place] [id number] [phone]    Schedule an appointment as soon as possible for a visit in 2 days        OUTPATIENT MEDICATIONS:  Discharge Medication List as of 3/24/2019  3:53 AM      100 mg labetalol 3 times daily      FINAL IMPRESSION  1. Pre-eclampsia in postpartum period    2. Acute UTI          Shree SKY (Dorinda), am scribing for, and in the presence of, Lloyd Morrison M.D.    Electronically signed by: Shree Munguia (Dorinda), 3/24/2019    Lloyd SKY M.D. personally performed the services described in this documentation, as scribed by Shree Munguia in my presence, and it is both accurate and complete.    C.    The note accurately reflects work and decisions made by me.  Lloyd Morrison  3/24/2019  4:56 AM

## 2019-03-24 NOTE — DISCHARGE INSTRUCTIONS
Return if you have the top number of your blood pressure greater than 180, the bottom number greater than 110, chest pain, shortness of breath, or fever.

## 2019-03-24 NOTE — ED TRIAGE NOTES
Chief Complaint   Patient presents with   • Hypertension   • Other     postpartum       Recent admit for pre-eclampsia.  gema CARTER, n/v.  Triage process explained to patient.  Pt back to waiting room.  Pt instructed to inform RN if any changes or questions arise.  Took 100 mg labetalol pta.

## 2019-09-18 ENCOUNTER — TELEPHONE (OUTPATIENT)
Dept: SCHEDULING | Facility: IMAGING CENTER | Age: 39
End: 2019-09-18

## 2019-11-12 ENCOUNTER — OFFICE VISIT (OUTPATIENT)
Dept: MEDICAL GROUP | Facility: LAB | Age: 39
End: 2019-11-12
Payer: COMMERCIAL

## 2019-11-12 VITALS
TEMPERATURE: 98.1 F | WEIGHT: 245.2 LBS | HEART RATE: 110 BPM | SYSTOLIC BLOOD PRESSURE: 118 MMHG | BODY MASS INDEX: 38.48 KG/M2 | HEIGHT: 67 IN | DIASTOLIC BLOOD PRESSURE: 68 MMHG | OXYGEN SATURATION: 93 %

## 2019-11-12 DIAGNOSIS — Z00.00 PREVENTATIVE HEALTH CARE: ICD-10-CM

## 2019-11-12 DIAGNOSIS — O13.9 PREGNANCY INDUCED HYPERTENSION, ANTEPARTUM: ICD-10-CM

## 2019-11-12 DIAGNOSIS — E66.9 OBESITY (BMI 30-39.9): ICD-10-CM

## 2019-11-12 PROCEDURE — 99214 OFFICE O/P EST MOD 30 MIN: CPT | Performed by: FAMILY MEDICINE

## 2019-11-12 RX ORDER — ACETAMINOPHEN AND CODEINE PHOSPHATE 120; 12 MG/5ML; MG/5ML
1 SOLUTION ORAL DAILY
COMMUNITY
End: 2021-01-14

## 2019-11-12 RX ORDER — NIFEDIPINE 90 MG/1
90 TABLET, EXTENDED RELEASE ORAL DAILY
COMMUNITY
End: 2019-11-21 | Stop reason: SDUPTHER

## 2019-11-12 RX ORDER — NIFEDIPINE 60 MG/1
60 TABLET, EXTENDED RELEASE ORAL DAILY
Qty: 15 TAB | Refills: 0 | Status: SHIPPED | OUTPATIENT
Start: 2019-11-12 | End: 2020-02-27 | Stop reason: SDUPTHER

## 2019-11-12 ASSESSMENT — PATIENT HEALTH QUESTIONNAIRE - PHQ9: CLINICAL INTERPRETATION OF PHQ2 SCORE: 0

## 2019-11-12 NOTE — PROGRESS NOTES
Subjective:     CC: Establish care    HPI:   Karin presents today with     History of pregnancy-induced hypertension:  This is a chronic stable issue, new to me.  Patient was diagnosed with pregnancy-induced hypertension in her last trimester and then postpartum preeclampsia.  Patient was treated with labetalol without control and then transitioned over to nifedipine 90 mg extended release.  She was symptomatic at the time she had high blood pressures however now she states she has low blood pressures at home however is asymptomatic.  She is compliant with her blood pressures.  She is not currently breast-feeding.  She does have a history of sinus tachycardia which improved with labetalol however she has had a full cardiac work-up showing no underlying etiology.    Past Medical History:   Diagnosis Date   • Breastfeeding (infant) 3/26/2010   • Delayed gastric emptying 4/24/2011   • Fear of flying 9/22/2014   • Impaired fasting glucose 11/19/2012   • Iron deficiency anemia 9/26/2011   • Vitamin d deficiency 9/26/2011       Social History     Tobacco Use   • Smoking status: Never Smoker   • Smokeless tobacco: Never Used   Substance Use Topics   • Alcohol use: No     Comment:  Rare   • Drug use: No       Current Outpatient Medications Ordered in Epic   Medication Sig Dispense Refill   • NIFEdipine SR (PROCARDIA XL) 90 MG CR tablet Take 90 mg by mouth every day.     • norethindrone (ARIAN) 0.35 MG tablet Take 1 Tab by mouth every day.     • NIFEdipine SR (PROCARDIA XL) 60 MG CR tablet Take 1 Tab by mouth every day. 15 Tab 0   • Prenatal MV-Min-Fe Fum-FA-DHA (PRENATAL 1 PO) Take  by mouth.     • vitamin D (CHOLECALCIFEROL) 1000 UNIT Tab Take 1,000 Units by mouth every day.     • docusate sodium (COLACE) 100 MG Cap Take 100 mg by mouth 2 times a day.       No current [facility]-ordered facility-administered medications on file.        Allergies:  Codeine      ROS:  Gen: no fevers/chill, no changes in weight  Eyes: no changes  "in vision  ENT: no sore throat, no hearing loss, no bloody nose  Pulm: no sob, no cough  CV: no chest pain, no palpitations  GI: no nausea/vomiting, no diarrhea  : no dysuria  MSk: no myalgias  Skin: no rash  Neuro: no headaches, no numbness/tingling  Heme/Lymph: no easy bruising      Objective:       Exam:  /68 (BP Location: Left arm, Patient Position: Sitting)   Pulse (!) 110   Temp 36.7 °C (98.1 °F) (Temporal)   Ht 1.702 m (5' 7\")   Wt 111.2 kg (245 lb 3.2 oz)   SpO2 93%   BMI 38.40 kg/m²  Body mass index is 38.4 kg/m².    Gen: Alert and oriented, No apparent distress.  Neck: Neck is supple without lymphadenopathy.  Lungs: Normal effort, CTA bilaterally, no wheezes, rhonchi, or rales  CV: Regular rate and rhythm. No murmurs, rubs, or gallops.               Ext: No clubbing, cyanosis, edema.    Assessment & Plan:     39 y.o. female with the following -     1. Obesity (BMI 30-39.9)  Patient's weight was discussed today, including healthy low-carb diet, 30-minutes of moderate exercise daily and avoiding sugars and high-fat foods.    - HEMOGLOBIN A1C; Future  - CBC WITH DIFFERENTIAL; Future  - Comp Metabolic Panel; Future  - Lipid Profile; Future  - TSH WITH REFLEX TO FT4; Future  - Patient identified as having weight management issue.  Appropriate orders and counseling given.    2. Preventative health care  Follow-up annual  - HEMOGLOBIN A1C; Future  - CBC WITH DIFFERENTIAL; Future  - Comp Metabolic Panel; Future  - Lipid Profile; Future  - TSH WITH REFLEX TO FT4; Future    3. Pregnancy induced hypertension, antepartum  Given patient is having lows at home however they are asymptomatic we can trial nifedipine 60 mg and she is to log blood pressures on this.  She will send them to me every day in my chart and we discussed strict follow-up and ER precautions.  We will continue to monitor she will also work on diet and exercise.        Please note that this dictation was created using voice recognition " software. I have made every reasonable attempt to correct obvious errors, but I expect that there are errors of grammar and possibly content that I did not discover before finalizing the note.

## 2019-11-20 ENCOUNTER — TELEPHONE (OUTPATIENT)
Dept: MEDICAL GROUP | Facility: LAB | Age: 39
End: 2019-11-20

## 2019-11-20 NOTE — TELEPHONE ENCOUNTER
Pt called and stated she took her BP today and it was 147/93 she wants to know what she is supposed to do as you had cut her dose

## 2019-11-21 RX ORDER — NIFEDIPINE 90 MG/1
90 TABLET, EXTENDED RELEASE ORAL DAILY
Qty: 90 TAB | Refills: 3 | Status: SHIPPED | OUTPATIENT
Start: 2019-11-21 | End: 2020-08-11

## 2019-11-22 LAB
ALBUMIN SERPL-MCNC: 4.6 G/DL (ref 3.5–5.5)
ALBUMIN/GLOB SERPL: 1.4 {RATIO} (ref 1.2–2.2)
ALP SERPL-CCNC: 80 IU/L (ref 39–117)
ALT SERPL-CCNC: 21 IU/L (ref 0–32)
AST SERPL-CCNC: 16 IU/L (ref 0–40)
BASOPHILS # BLD AUTO: 0 X10E3/UL (ref 0–0.2)
BASOPHILS NFR BLD AUTO: 0 %
BILIRUB SERPL-MCNC: 0.2 MG/DL (ref 0–1.2)
BUN SERPL-MCNC: 12 MG/DL (ref 6–20)
BUN/CREAT SERPL: 15 (ref 9–23)
CALCIUM SERPL-MCNC: 10.1 MG/DL (ref 8.7–10.2)
CHLORIDE SERPL-SCNC: 104 MMOL/L (ref 96–106)
CHOLEST SERPL-MCNC: 227 MG/DL (ref 100–199)
CO2 SERPL-SCNC: 17 MMOL/L (ref 20–29)
CREAT SERPL-MCNC: 0.78 MG/DL (ref 0.57–1)
EOSINOPHIL # BLD AUTO: 0.4 X10E3/UL (ref 0–0.4)
EOSINOPHIL NFR BLD AUTO: 4 %
ERYTHROCYTE [DISTWIDTH] IN BLOOD BY AUTOMATED COUNT: 13.9 % (ref 12.3–15.4)
GLOBULIN SER CALC-MCNC: 3.4 G/DL (ref 1.5–4.5)
GLUCOSE SERPL-MCNC: 106 MG/DL (ref 65–99)
HBA1C MFR BLD: 6 % (ref 4.8–5.6)
HCT VFR BLD AUTO: 44.3 % (ref 34–46.6)
HDLC SERPL-MCNC: 49 MG/DL
HGB BLD-MCNC: 15.2 G/DL (ref 11.1–15.9)
IMM GRANULOCYTES # BLD AUTO: 0.1 X10E3/UL (ref 0–0.1)
IMM GRANULOCYTES NFR BLD AUTO: 1 %
IMMATURE CELLS  115398: ABNORMAL
LABORATORY COMMENT REPORT: ABNORMAL
LDLC SERPL CALC-MCNC: 143 MG/DL (ref 0–99)
LYMPHOCYTES # BLD AUTO: 4.2 X10E3/UL (ref 0.7–3.1)
LYMPHOCYTES NFR BLD AUTO: 40 %
MCH RBC QN AUTO: 30 PG (ref 26.6–33)
MCHC RBC AUTO-ENTMCNC: 34.3 G/DL (ref 31.5–35.7)
MCV RBC AUTO: 88 FL (ref 79–97)
MONOCYTES # BLD AUTO: 0.7 X10E3/UL (ref 0.1–0.9)
MONOCYTES NFR BLD AUTO: 7 %
MORPHOLOGY BLD-IMP: ABNORMAL
NEUTROPHILS # BLD AUTO: 5 X10E3/UL (ref 1.4–7)
NEUTROPHILS NFR BLD AUTO: 48 %
NRBC BLD AUTO-RTO: ABNORMAL %
PLATELET # BLD AUTO: 295 X10E3/UL (ref 150–450)
POTASSIUM SERPL-SCNC: 5 MMOL/L (ref 3.5–5.2)
PROT SERPL-MCNC: 8 G/DL (ref 6–8.5)
RBC # BLD AUTO: 5.06 X10E6/UL (ref 3.77–5.28)
SODIUM SERPL-SCNC: 140 MMOL/L (ref 134–144)
TRIGL SERPL-MCNC: 175 MG/DL (ref 0–149)
TSH SERPL DL<=0.005 MIU/L-ACNC: 1.18 UIU/ML (ref 0.45–4.5)
VLDLC SERPL CALC-MCNC: 35 MG/DL (ref 5–40)
WBC # BLD AUTO: 10.3 X10E3/UL (ref 3.4–10.8)

## 2020-01-02 DIAGNOSIS — Z00.00 PREVENTATIVE HEALTH CARE: ICD-10-CM

## 2020-02-11 ENCOUNTER — OFFICE VISIT (OUTPATIENT)
Dept: MEDICAL GROUP | Facility: LAB | Age: 40
End: 2020-02-11
Payer: COMMERCIAL

## 2020-02-11 VITALS
TEMPERATURE: 98.7 F | OXYGEN SATURATION: 95 % | SYSTOLIC BLOOD PRESSURE: 122 MMHG | HEIGHT: 67 IN | BODY MASS INDEX: 39.49 KG/M2 | DIASTOLIC BLOOD PRESSURE: 78 MMHG | WEIGHT: 251.6 LBS | HEART RATE: 107 BPM

## 2020-02-11 DIAGNOSIS — Z00.00 WELL WOMAN EXAM WITHOUT GYNECOLOGICAL EXAM: ICD-10-CM

## 2020-02-11 DIAGNOSIS — E78.49 OTHER HYPERLIPIDEMIA: ICD-10-CM

## 2020-02-11 DIAGNOSIS — R73.03 PREDIABETES: ICD-10-CM

## 2020-02-11 DIAGNOSIS — O13.9 PREGNANCY INDUCED HYPERTENSION, ANTEPARTUM: ICD-10-CM

## 2020-02-11 PROCEDURE — 99395 PREV VISIT EST AGE 18-39: CPT | Performed by: FAMILY MEDICINE

## 2020-02-11 ASSESSMENT — PATIENT HEALTH QUESTIONNAIRE - PHQ9: CLINICAL INTERPRETATION OF PHQ2 SCORE: 0

## 2020-02-11 NOTE — PROGRESS NOTES
Subjective:     CC:   Chief Complaint   Patient presents with   • Results       HPI:   Karin Briseno is a 39 y.o. female who presents for annual exam. She is feeling well and denies any complaints.    Patient has GYN provider: yes, Dr coffey  Last pap: 2019  Last mammo: Age 42  Last colonoscopy: Age 52  Last bone density test: Age 67  Qualify for hep C screen: No  Last Tdap: Done  Gardiasil: Aged out  Hx. STD's: No  Birth control: None    Menses every month with 28-31 days moderate bleeding.  Cramping is moderate.   She does not take OTC analgesics for cramps.  No significant bloating/fluid retention, pelvic pain, or dyspareunia. No vaginal discharge   No breast tenderness, mass, nipple discharge, changes in size or contour, or abnormal cyclic discomfort.  She does not perform regular self breast exams.  Regular exercise: no   Diet: balanced     She  has a past medical history of Breastfeeding (infant) (3/26/2010), Delayed gastric emptying (4/24/2011), Fear of flying (9/22/2014), Impaired fasting glucose (11/19/2012), Iron deficiency anemia (9/26/2011), and Vitamin d deficiency (9/26/2011). She also has no past medical history of Asthma or Diabetes (HCC).  She  has no past surgical history on file.    Family History   Problem Relation Age of Onset   • Cancer Father         Hx testicular   • Diabetes Father    • Heart Disease Father    • Hypertension Father    • Cancer Maternal Grandmother         MGGM Colon cancer 80s)       Social History     Socioeconomic History   • Marital status:      Spouse name: Not on file   • Number of children: 1   • Years of education: Not on file   • Highest education level: Not on file   Occupational History     Employer: [employer]   Social Needs   • Financial resource strain: Not on file   • Food insecurity:     Worry: Not on file     Inability: Not on file   • Transportation needs:     Medical: Not on file     Non-medical: Not on file   Tobacco Use   • Smoking  status: Never Smoker   • Smokeless tobacco: Never Used   Substance and Sexual Activity   • Alcohol use: No     Comment:  Rare   • Drug use: No   • Sexual activity: Yes     Partners: Male   Lifestyle   • Physical activity:     Days per week: Not on file     Minutes per session: Not on file   • Stress: Not on file   Relationships   • Social connections:     Talks on phone: Not on file     Gets together: Not on file     Attends Ca[employer] service: Not on file     Active member of club or organization: Not on file     Attends meetings of clubs or organizations: Not on file     Relationship status: Not on file   • Intimate partner violence:     Fear of current or ex partner: Not on file     Emotionally abused: Not on file     Physically abused: Not on file     Forced sexual activity: Not on file   Other Topics Concern   • Not on file   Social History Narrative    Mother of 2. Works for [employer]       Patient Active Problem List    Diagnosis Date Noted   •  (spontaneous vaginal delivery) 2019     Priority: High   • Pain related to vaginal delivery 2019     Priority: High   • Pregnancy induced hypertension 2019   • Hypertension affecting pregnancy, third trimester 2019   • Mastalgia in female 2016   • Preventative health care 2014   • Fear of flying 2014   • Impaired fasting glucose 2012   • Vitamin D insufficiency 2011   • Delayed gastric emptying 2011         Current Outpatient Medications   Medication Sig Dispense Refill   • NIFEdipine SR (PROCARDIA XL) 90 MG CR tablet Take 1 Tab by mouth every day. 90 Tab 3   • norethindrone (ARIAN) 0.35 MG tablet Take 1 Tab by mouth every day.     • NIFEdipine SR (PROCARDIA XL) 60 MG CR tablet Take 1 Tab by mouth every day. 15 Tab 0   • vitamin D (CHOLECALCIFEROL) 1000 UNIT Tab Take 1,000 Units by mouth every day.     • Prenatal MV-Min-Fe Fum-FA-DHA (PRENATAL 1 PO) Take  by mouth.     • docusate sodium (COLACE) 100 MG Cap  "Take 100 mg by mouth 2 times a day.       No current facility-administered medications for this visit.      Allergies   Allergen Reactions   • Codeine Vomiting       Review of Systems   Constitutional: Negative for fever, chills and malaise/fatigue.   HENT: Negative for congestion.    Eyes: Negative for pain.   Respiratory: Negative for cough and shortness of breath.    Cardiovascular: Negative for leg swelling.   Gastrointestinal: Negative for nausea, vomiting, abdominal pain and diarrhea.   Genitourinary: Negative for dysuria and hematuria.   Skin: Negative for rash.   Neurological: Negative for dizziness, focal weakness and headaches.   Endo/Heme/Allergies: Does not bruise/bleed easily.   Psychiatric/Behavioral: Negative for depression.  The patient is not nervous/anxious.      Objective:     /78 (BP Location: Left arm, Patient Position: Sitting)   Pulse (!) 107   Temp 37.1 °C (98.7 °F) (Temporal)   Ht 1.702 m (5' 7\")   Wt 114.1 kg (251 lb 9.6 oz)   SpO2 95%   BMI 39.41 kg/m²   Body mass index is 39.41 kg/m².  Wt Readings from Last 4 Encounters:   02/11/20 114.1 kg (251 lb 9.6 oz)   11/12/19 111.2 kg (245 lb 3.2 oz)   03/23/19 113 kg (249 lb 1.9 oz)   03/20/19 116 kg (255 lb 11.7 oz)       Physical Exam:  Constitutional: Well-developed and well-nourished. Not diaphoretic. No distress.   Skin: Skin is warm and dry. No rash noted.  Head: Atraumatic without lesions.  Eyes: Conjunctivae and extraocular motions are normal. Pupils are equal, round, and reactive to light. No scleral icterus.   Ears:  External ears unremarkable. Tympanic membranes clear and intact.  Nose: Nares patent. Septum midline. Turbinates without erythema nor edema. No discharge.   Mouth/Throat: Dentition is good. Tongue normal. Oropharynx is clear and moist. Posterior pharynx without erythema or exudates.  Neck: Supple, trachea midline. Normal range of motion. No thyromegaly present. No lymphadenopathy--cervical or " supraclavicular.  Cardiovascular: Regular rate and rhythm, S1 and S2 without murmur, rubs, or gallops.    Abdomen: Soft, non tender, and without distention. Active bowel sounds in all four quadrants. No rebound, guarding, masses or HSM.  Extremities: No cyanosis, clubbing, erythema, nor edema. Distal pulses intact and symmetric.   Musculoskeletal: All major joints AROM full in all directions without pain.  Neurological: Alert and oriented x 3  Psychiatric:  Behavior, mood, and affect are appropriate.    Assessment and Plan:     1. Prediabetes  Hemoglobin A1c 6.0.  Patient to work on diet and exercise.  Follow-up 6 months    2. Other hyperlipidemia  Elevated total and LDL.  Patient to work on diet and exercise.  Follow-up 6 months.    3. Well woman exam without gynecological exam  Up-to-date on preventive care, normal physical exam, labs evaluated    4. Pregnancy induced hypertension, antepartum  Blood pressure logs okay on 60 mg of nifedipine however she was anxious so she increased it back to 90.  We will continue 16 continue to log.  Strict ER precautions discussed.    Sees dentist and eye doctor   Labs per orders  Immunizations per orders  Patient counseled about skin care, diet, supplements, vitamins, safe sex and exercise.    Follow-up: No follow-ups on file.    Please note that this dictation was created using voice recognition software. I have made every reasonable attempt to correct obvious errors, but I expect that there are errors of grammar and possibly content that I did not discover before finalizing the note.

## 2020-02-28 RX ORDER — NIFEDIPINE 60 MG/1
60 TABLET, EXTENDED RELEASE ORAL DAILY
Qty: 15 TAB | Refills: 0 | Status: SHIPPED | OUTPATIENT
Start: 2020-02-28 | End: 2020-03-16 | Stop reason: SDUPTHER

## 2020-02-28 NOTE — TELEPHONE ENCOUNTER
Received request via: Pharmacy    Was the patient seen in the last year in this department? Yes2/11/20    Does the patient have an active prescription (recently filled or refills available) for medication(s) requested? No

## 2020-03-16 RX ORDER — NIFEDIPINE 60 MG/1
60 TABLET, EXTENDED RELEASE ORAL DAILY
Qty: 15 TAB | Refills: 0 | Status: SHIPPED | OUTPATIENT
Start: 2020-03-16 | End: 2020-03-30 | Stop reason: SDUPTHER

## 2020-03-30 RX ORDER — NIFEDIPINE 60 MG/1
60 TABLET, EXTENDED RELEASE ORAL DAILY
Qty: 15 TAB | Refills: 0 | Status: SHIPPED | OUTPATIENT
Start: 2020-03-30 | End: 2020-04-14 | Stop reason: SDUPTHER

## 2020-04-15 RX ORDER — NIFEDIPINE 60 MG/1
60 TABLET, EXTENDED RELEASE ORAL DAILY
Qty: 15 TAB | Refills: 0 | Status: SHIPPED | OUTPATIENT
Start: 2020-04-15 | End: 2020-04-29 | Stop reason: SDUPTHER

## 2020-04-29 RX ORDER — NIFEDIPINE 30 MG
30 TABLET, EXTENDED RELEASE ORAL DAILY
Qty: 30 TAB | Refills: 3 | Status: SHIPPED | OUTPATIENT
Start: 2020-04-29 | End: 2020-06-05 | Stop reason: SDUPTHER

## 2020-04-29 RX ORDER — NIFEDIPINE 60 MG/1
60 TABLET, EXTENDED RELEASE ORAL DAILY
Qty: 15 TAB | Refills: 0 | Status: SHIPPED | OUTPATIENT
Start: 2020-04-29 | End: 2020-08-11

## 2020-06-05 RX ORDER — NIFEDIPINE 30 MG
30 TABLET, EXTENDED RELEASE ORAL DAILY
Qty: 30 TAB | Refills: 3 | Status: SHIPPED | OUTPATIENT
Start: 2020-06-05 | End: 2020-09-04 | Stop reason: SDUPTHER

## 2020-08-11 ENCOUNTER — OFFICE VISIT (OUTPATIENT)
Dept: MEDICAL GROUP | Facility: LAB | Age: 40
End: 2020-08-11
Payer: COMMERCIAL

## 2020-08-11 VITALS
WEIGHT: 236.8 LBS | HEART RATE: 87 BPM | HEIGHT: 67 IN | TEMPERATURE: 97.3 F | BODY MASS INDEX: 37.17 KG/M2 | OXYGEN SATURATION: 97 % | SYSTOLIC BLOOD PRESSURE: 118 MMHG | DIASTOLIC BLOOD PRESSURE: 78 MMHG

## 2020-08-11 DIAGNOSIS — I10 HYPERTENSION, UNSPECIFIED TYPE: ICD-10-CM

## 2020-08-11 DIAGNOSIS — E78.49 OTHER HYPERLIPIDEMIA: ICD-10-CM

## 2020-08-11 PROBLEM — R52 PAIN RELATED TO VAGINAL DELIVERY: Status: RESOLVED | Noted: 2019-03-16 | Resolved: 2020-08-11

## 2020-08-11 PROCEDURE — 99214 OFFICE O/P EST MOD 30 MIN: CPT | Performed by: FAMILY MEDICINE

## 2020-08-11 ASSESSMENT — FIBROSIS 4 INDEX: FIB4 SCORE: 0.45

## 2020-08-11 NOTE — PROGRESS NOTES
Subjective:     CC: Hypertension  HPI:   Karin presents today with     Pretension:  This is a chronic stable issue.  Patient has essential hypertension after being diagnosed gestationally.  We have been titrating down her nifedipine and she is currently on 30 mg of extended release.  Her blood pressures have been low to normal and she has lost 15 to 20 pounds in the last several months.  She is working on weight loss in order to combat her hyperlipidemia and her blood pressure issues.  She is asymptomatic.    Past Medical History:   Diagnosis Date   • Breastfeeding (infant) 3/26/2010   • Delayed gastric emptying 4/24/2011   • Fear of flying 9/22/2014   • Impaired fasting glucose 11/19/2012   • Iron deficiency anemia 9/26/2011   • Vitamin d deficiency 9/26/2011       Social History     Tobacco Use   • Smoking status: Never Smoker   • Smokeless tobacco: Never Used   Substance Use Topics   • Alcohol use: No     Comment:  Rare   • Drug use: No       Current Outpatient Medications Ordered in Epic   Medication Sig Dispense Refill   • NIFEdipine (ADALAT CC) 30 MG CR tablet Take 1 Tab by mouth every day. 30 Tab 3   • norethindrone (ARIAN) 0.35 MG tablet Take 1 Tab by mouth every day.     • vitamin D (CHOLECALCIFEROL) 1000 UNIT Tab Take 1,000 Units by mouth every day.     • docusate sodium (COLACE) 100 MG Cap Take 100 mg by mouth 2 times a day.     • Prenatal MV-Min-Fe Fum-FA-DHA (PRENATAL 1 PO) Take  by mouth.       No current Epic-ordered facility-administered medications on file.        Allergies:  Codeine      ROS:  Gen: no fevers/chill, no changes in weight  Eyes: no changes in vision  ENT: no sore throat, no hearing loss, no bloody nose  Pulm: no sob, no cough  CV: no chest pain, no palpitations  GI: no nausea/vomiting, no diarrhea  : no dysuria  MSk: no myalgias  Skin: no rash  Neuro: no headaches, no numbness/tingling  Heme/Lymph: no easy bruising      Objective:       Exam:  /78 (BP Location: Left arm,  "Patient Position: Sitting)   Pulse 87   Temp 36.3 °C (97.3 °F) (Temporal)   Ht 1.702 m (5' 7\")   Wt 107.4 kg (236 lb 12.8 oz)   SpO2 97%   BMI 37.09 kg/m²  Body mass index is 37.09 kg/m².    Gen: Alert and oriented, No apparent distress.  Neck: Neck is supple without lymphadenopathy.  Lungs: Normal effort, CTA bilaterally, no wheezes, rhonchi, or rales  CV: Regular rate and rhythm. No murmurs, rubs, or gallops.               Ext: No clubbing, cyanosis, edema.      Assessment & Plan:     40 y.o. female with the following -     1. Other hyperlipidemia  Follow-up repeat labs and annual.  - HEMOGLOBIN A1C; Future  - CBC WITH DIFFERENTIAL; Future  - Comp Metabolic Panel; Future  - Lipid Profile; Future  - TSH WITH REFLEX TO FT4; Future    2. Hypertension, unspecified type  I foresee patient coming off of her blood pressure medication completely after evaluating her logs.  She does have low to normal numbers and if her see coming off this in the future as she continues to lose weight.  She will continue 30 for now and we will slowly try to treat off in 3 months.  Follow-up discussed      Please note that this dictation was created using voice recognition software. I have made every reasonable attempt to correct obvious errors, but I expect that there are errors of grammar and possibly content that I did not discover before finalizing the note.      "

## 2020-09-04 ENCOUNTER — PATIENT MESSAGE (OUTPATIENT)
Dept: MEDICAL GROUP | Facility: LAB | Age: 40
End: 2020-09-04

## 2020-09-24 ENCOUNTER — TELEPHONE (OUTPATIENT)
Dept: MEDICAL GROUP | Facility: LAB | Age: 40
End: 2020-09-24

## 2020-09-25 ENCOUNTER — TELEMEDICINE (OUTPATIENT)
Dept: MEDICAL GROUP | Facility: LAB | Age: 40
End: 2020-09-25
Payer: COMMERCIAL

## 2020-09-25 DIAGNOSIS — I15.9 SECONDARY HYPERTENSION: ICD-10-CM

## 2020-09-25 DIAGNOSIS — E55.9 VITAMIN D DEFICIENCY: ICD-10-CM

## 2020-09-25 PROCEDURE — 99213 OFFICE O/P EST LOW 20 MIN: CPT | Mod: 95,CR | Performed by: INTERNAL MEDICINE

## 2020-09-25 NOTE — PROGRESS NOTES
Telemedicine Video Visit: Established Patient   This Remote Face to Face encounter was conducted via Zoom. Given the importance of social distancing and other strategies recommended to reduce the risk of COVID-19 transmission, I am providing medical care to this patient via audio/video visit in place of an in person visit at the request of the patient. Verbal consent to telehealth, risks, benefits, and consequences were discussed. Patient retains the right to withdraw at any time. All existing confidentiality protections apply. The patient has access to all transmitted medical information. No dissemination of any patient images or information to other entities without further written consent.  Subjective:     Chief Complaint   Patient presents with   • Hypertension       Karin Briseno is a 40 y.o. female presenting for evaluation and management of:    Concerns about hypertension    ROS    Denies any recent fevers or chills. No nausea or vomiting. No chest pains or shortness of breath.     Allergies   Allergen Reactions   • Codeine Vomiting       Current medicines (including changes today)  Current Outpatient Medications   Medication Sig Dispense Refill   • NIFEdipine (ADALAT CC) 30 MG CR tablet Take 1 Tab by mouth every day. 30 Tab 0   • norethindrone (ARIAN) 0.35 MG tablet Take 1 Tab by mouth every day.     • vitamin D (CHOLECALCIFEROL) 1000 UNIT Tab Take 1,000 Units by mouth every day.     • docusate sodium (COLACE) 100 MG Cap Take 100 mg by mouth 2 times a day.     • Prenatal MV-Min-Fe Fum-FA-DHA (PRENATAL 1 PO) Take  by mouth.       No current facility-administered medications for this visit.        Patient Active Problem List    Diagnosis Date Noted   •  (spontaneous vaginal delivery) 2019     Priority: High   • Prediabetes 2020   • Other hyperlipidemia 2020   • Pregnancy induced hypertension 2019   • Hypertension affecting pregnancy, third trimester 2019   • Mastalgia in  female 02/24/2016   • Preventative health care 09/22/2014   • Fear of flying 09/22/2014   • Impaired fasting glucose 11/19/2012   • Vitamin D insufficiency 09/26/2011   • Delayed gastric emptying 04/24/2011       Family History   Problem Relation Age of Onset   • Cancer Father         Hx testicular   • Diabetes Father    • Heart Disease Father    • Hypertension Father    • Cancer Maternal Grandmother         MGGM Colon cancer 80s)       She  has a past medical history of Breastfeeding (infant) (3/26/2010), Delayed gastric emptying (4/24/2011), Fear of flying (9/22/2014), Impaired fasting glucose (11/19/2012), Iron deficiency anemia (9/26/2011), and Vitamin d deficiency (9/26/2011). She also has no past medical history of Asthma or Diabetes (HCC).  She  has no past surgical history on file.       Objective:   Vitals obtained by patient:  There were no vitals taken for this visit.    Physical Exam:   Constitutional: Alert, no distress, well-groomed.  Skin: No rashes in visible areas.  Eye: Round. Conjunctiva clear, lids normal. No icterus.   ENMT: Lips pink without lesions, good dentition, moist mucous membranes. Phonation normal.  Neck: No masses, no thyromegaly. Moves freely without pain.  CV: Pulse as reported by patient  Respiratory: Unlabored respiratory effort, no cough or audible wheeze  Psych: Alert and oriented x3, normal affect and mood.       Assessment and Plan:   The following treatment plan was discussed:     1. Vitamin D deficiency  - VITAMIN D,25 HYDROXY; Future  - URINALYSIS,CULTURE IF INDICATED; Future  - Comp Metabolic Panel; Future  - CBC WITH DIFFERENTIAL; Future  - FREE THYROXINE; Future  - TSH; Future    2. Secondary hypertension  - URINALYSIS,CULTURE IF INDICATED; Future  - Comp Metabolic Panel; Future  - CBC WITH DIFFERENTIAL; Future  - FREE THYROXINE; Future  - TSH; Future        Follow-up: No follow-ups on file.    Face to Face Video Visit:   I spent 15 minutes with patient/guardian and I  conducted this visit with audio and video present.  Yahir Elena D.O.

## 2020-10-05 ENCOUNTER — TELEPHONE (OUTPATIENT)
Dept: MEDICAL GROUP | Facility: LAB | Age: 40
End: 2020-10-05

## 2020-10-05 RX ORDER — NIFEDIPINE 30 MG
TABLET, EXTENDED RELEASE ORAL
Qty: 30 TAB | Refills: 0 | Status: SHIPPED | OUTPATIENT
Start: 2020-10-05 | End: 2020-11-03 | Stop reason: SDUPTHER

## 2020-10-05 NOTE — TELEPHONE ENCOUNTER
Received request via: Pharmacy    Was the patient seen in the last year in this department? Yes  9/25/20  Does the patient have an active prescription (recently filled or refills available) for medication(s) requested? No

## 2020-10-05 NOTE — TELEPHONE ENCOUNTER
Pt calling for a copy of labs, printed and placed at . Will call or my chart back for a follow up with Dr Elena

## 2020-11-03 RX ORDER — NIFEDIPINE 30 MG
TABLET, EXTENDED RELEASE ORAL
Qty: 30 TAB | Refills: 0 | Status: SHIPPED | OUTPATIENT
Start: 2020-11-03 | End: 2020-11-04

## 2020-11-03 NOTE — TELEPHONE ENCOUNTER
----- Message from Karin Briseno sent at 11/3/2020  5:59 AM PST -----  Regarding: Medication Renewal Request  Contact: [phone]  Refills have been requested for the following medications:        NIFEdipine (ADALAT CC) 30 MG CR tablet [Tabby Church, A.P.R.N.]    Preferred pharmacy: [facility] [address] [place] [address], [place] - [address]  Delivery method: Pickup

## 2020-11-04 RX ORDER — NIFEDIPINE 30 MG
TABLET, EXTENDED RELEASE ORAL
Qty: 30 TAB | Refills: 0 | Status: SHIPPED | OUTPATIENT
Start: 2020-11-04 | End: 2020-12-29

## 2020-11-05 PROCEDURE — C9803 HOPD COVID-19 SPEC COLLECT: HCPCS

## 2020-11-05 PROCEDURE — U0003 INFECTIOUS AGENT DETECTION BY NUCLEIC ACID (DNA OR RNA); SEVERE ACUTE RESPIRATORY SYNDROME CORONAVIRUS 2 (SARS-COV-2) (CORONAVIRUS DISEASE [COVID-19]), AMPLIFIED PROBE TECHNIQUE, MAKING USE OF HIGH THROUGHPUT TECHNOLOGIES AS DESCRIBED BY CMS-2020-01-R: HCPCS

## 2020-11-06 ENCOUNTER — HOSPITAL ENCOUNTER (OUTPATIENT)
Dept: LAB | Facility: MEDICAL CENTER | Age: 40
End: 2020-11-05
Payer: COMMERCIAL

## 2020-11-07 LAB
COVID ORDER STATUS COVID19: NORMAL
SARS-COV-2 RNA RESP QL NAA+PROBE: NOTDETECTED
SPECIMEN SOURCE: NORMAL

## 2020-11-16 LAB
25(OH)D3+25(OH)D2 SERPL-MCNC: 38.4 NG/ML (ref 30–100)
ALBUMIN SERPL-MCNC: 4.9 G/DL (ref 3.8–4.8)
ALBUMIN/GLOB SERPL: 1.6 {RATIO} (ref 1.2–2.2)
ALP SERPL-CCNC: 71 IU/L (ref 39–117)
ALT SERPL-CCNC: 24 IU/L (ref 0–32)
APPEARANCE UR: CLEAR
AST SERPL-CCNC: 16 IU/L (ref 0–40)
BACTERIA #/AREA URNS HPF: ABNORMAL /[HPF]
BASOPHILS # BLD AUTO: 0.1 X10E3/UL (ref 0–0.2)
BASOPHILS NFR BLD AUTO: 0 %
BILIRUB SERPL-MCNC: <0.2 MG/DL (ref 0–1.2)
BILIRUB UR QL STRIP: NEGATIVE
BUN SERPL-MCNC: 11 MG/DL (ref 6–20)
BUN/CREAT SERPL: 14 (ref 9–23)
CALCIUM SERPL-MCNC: 10.1 MG/DL (ref 8.7–10.2)
CASTS URNS MICRO: ABNORMAL
CASTS URNS QL MICRO: ABNORMAL
CHLORIDE SERPL-SCNC: 104 MMOL/L (ref 96–106)
CO2 SERPL-SCNC: 22 MMOL/L (ref 20–29)
COLOR UR: YELLOW
CREAT SERPL-MCNC: 0.81 MG/DL (ref 0.57–1)
CRYSTALS URNS MICRO: ABNORMAL
EOSINOPHIL # BLD AUTO: 0.4 X10E3/UL (ref 0–0.4)
EOSINOPHIL NFR BLD AUTO: 3 %
EPI CELLS #/AREA URNS HPF: >10 /HPF (ref 0–10)
ERYTHROCYTE [DISTWIDTH] IN BLOOD BY AUTOMATED COUNT: 12.9 % (ref 11.7–15.4)
GLOBULIN SER CALC-MCNC: 3 G/DL (ref 1.5–4.5)
GLUCOSE SERPL-MCNC: 110 MG/DL (ref 65–99)
GLUCOSE UR QL: NEGATIVE
HCT VFR BLD AUTO: 47.7 % (ref 34–46.6)
HGB BLD-MCNC: 15.9 G/DL (ref 11.1–15.9)
HGB UR QL STRIP: ABNORMAL
IMM GRANULOCYTES # BLD AUTO: 0.1 X10E3/UL (ref 0–0.1)
IMM GRANULOCYTES NFR BLD AUTO: 1 %
IMMATURE CELLS  115398: ABNORMAL
KETONES UR QL STRIP: NEGATIVE
LEUKOCYTE ESTERASE UR QL STRIP: NEGATIVE
LYMPHOCYTES # BLD AUTO: 5.5 X10E3/UL (ref 0.7–3.1)
LYMPHOCYTES NFR BLD AUTO: 44 %
MCH RBC QN AUTO: 29.9 PG (ref 26.6–33)
MCHC RBC AUTO-ENTMCNC: 33.3 G/DL (ref 31.5–35.7)
MCV RBC AUTO: 90 FL (ref 79–97)
MICRO URNS: ABNORMAL
MICRO URNS: ABNORMAL
MONOCYTES # BLD AUTO: 0.6 X10E3/UL (ref 0.1–0.9)
MONOCYTES NFR BLD AUTO: 5 %
MORPHOLOGY BLD-IMP: ABNORMAL
MUCOUS THREADS URNS QL MICRO: PRESENT
NEUTROPHILS # BLD AUTO: 5.7 X10E3/UL (ref 1.4–7)
NEUTROPHILS NFR BLD AUTO: 47 %
NITRITE UR QL STRIP: NEGATIVE
NRBC BLD AUTO-RTO: ABNORMAL %
PH UR STRIP: 5.5 [PH] (ref 5–7.5)
PLATELET # BLD AUTO: 385 X10E3/UL (ref 150–450)
POTASSIUM SERPL-SCNC: 4.7 MMOL/L (ref 3.5–5.2)
PROT SERPL-MCNC: 7.9 G/DL (ref 6–8.5)
PROT UR QL STRIP: NEGATIVE
RBC # BLD AUTO: 5.31 X10E6/UL (ref 3.77–5.28)
RBC #/AREA URNS HPF: ABNORMAL /HPF (ref 0–2)
RENAL EPI CELLS #/AREA URNS HPF: ABNORMAL /[HPF]
SODIUM SERPL-SCNC: 143 MMOL/L (ref 134–144)
SP GR UR: 1.02 (ref 1–1.03)
T VAGINALIS URNS QL MICRO: ABNORMAL
T4 FREE SERPL-MCNC: 1.17 NG/DL (ref 0.82–1.77)
TSH SERPL DL<=0.005 MIU/L-ACNC: 1.44 UIU/ML (ref 0.45–4.5)
UNIDENT CRYS URNS QL MICRO: ABNORMAL
URINALYSIS REFLEX  377202: ABNORMAL
URNS CMNT MICRO: ABNORMAL
UROBILINOGEN UR STRIP-MCNC: 0.2 MG/DL (ref 0.2–1)
WBC # BLD AUTO: 12.3 X10E3/UL (ref 3.4–10.8)
WBC #/AREA URNS HPF: ABNORMAL /HPF (ref 0–5)
YEAST #/AREA URNS HPF: ABNORMAL /[HPF]

## 2020-12-06 NOTE — ANESTHESIA QCDR
2019 [facility] Clinical Data Registry (for Quality Improvement)     Postoperative nausea/vomiting risk protocol (Adult = 18 yrs and Pediatric 3-17 yrs)- (430 and 463)  General inhalation anesthetic (NOT TIVA) with PONV risk factors: No  Provision of anti-emetic therapy with at least 2 different classes of agents: N/A  Patient DID NOT receive anti-emetic therapy and reason is documented in Medical Record: N/A    Multimodal Pain Management- (AQI59)  Patient undergoing Elective Surgery (i.e. Outpatient, or ASC, or Prescheduled Surgery prior to Hospital Admission): No  Use of Multimodal Pain Management, two or more drugs and/or interventions, NOT including systemic opioids: N/A  Exception: Documented allergy to multiple classes of analgesics: N/A    PACU assessment of acute postoperative pain prior to Anesthesia Care End- Applies to Patients Age = 18- (ABG7)  Initial PACU pain score is which of the following: < 7/10  Patient unable to report pain score: N/A    Post-anesthetic transfer of care checklist/protocol to PACU/ICU- (426 and 427)  Upon conclusion of case, patient transferred to which of the following locations: PACU/Non-ICU  Use of transfer checklist/protocol: Yes  Exclusion: Service Performed in Patient Hospital Room (and thus did not require transfer): N/A    PACU Reintubation- (AQI31)  General anesthesia requiring endotracheal intubation (ETT) along with subsequent extubation in OR or PACU: No  Required reintubation in the PACU: N/A  Extubation was a planned trial documented in the medical record prior to removal of the original airway device: N/A    Unplanned admission to ICU related to anesthesia service up through end of PACU care- (MD51)  Unplanned admission to ICU (not initially anticipated at anesthesia start time): No          Discharged

## 2020-12-28 NOTE — TELEPHONE ENCOUNTER
Received request via: Pharmacy    Was the patient seen in the last year in this department? Yes  9/25/2020  Does the patient have an active prescription (recently filled or refills available) for medication(s) requested? No

## 2020-12-29 RX ORDER — NIFEDIPINE 30 MG
TABLET, EXTENDED RELEASE ORAL
Qty: 30 TAB | Refills: 0 | Status: SHIPPED | OUTPATIENT
Start: 2020-12-29 | End: 2021-02-01

## 2021-01-14 ENCOUNTER — TELEMEDICINE (OUTPATIENT)
Dept: MEDICAL GROUP | Facility: LAB | Age: 41
End: 2021-01-14
Payer: COMMERCIAL

## 2021-01-14 VITALS
SYSTOLIC BLOOD PRESSURE: 122 MMHG | BODY MASS INDEX: 37.83 KG/M2 | WEIGHT: 241 LBS | DIASTOLIC BLOOD PRESSURE: 82 MMHG | HEIGHT: 67 IN

## 2021-01-14 DIAGNOSIS — Z00.00 WELL WOMAN EXAM WITHOUT GYNECOLOGICAL EXAM: ICD-10-CM

## 2021-01-14 PROCEDURE — 99395 PREV VISIT EST AGE 18-39: CPT | Mod: 95,GT | Performed by: FAMILY MEDICINE

## 2021-01-14 ASSESSMENT — PATIENT HEALTH QUESTIONNAIRE - PHQ9: CLINICAL INTERPRETATION OF PHQ2 SCORE: 0

## 2021-01-14 ASSESSMENT — FIBROSIS 4 INDEX: FIB4 SCORE: 0.32

## 2021-01-14 NOTE — PROGRESS NOTES
Virtual Visit: Established Patient   This visit was conducted via Zoom using secure and encrypted videoconferencing technology. The patient was in a private location in the state of [place].    The patient's identity was confirmed and verbal consent was obtained for this virtual visit.    Subjective:   CC: No chief complaint on file.      Karin Briseno is a 40 y.o. female presenting for evaluation and management of: AW    Patient has GYN provider: yes, Dr coffey  Last pap: 2019  Last mammo: Age 42  Last colonoscopy: Age 52  Last bone density test: Age 67  Qualify for hep C screen: No  Last Tdap: Done  Gardiasil: Aged out  Hx. STD's: No  Birth control: None     Menses every month with 28-31 days moderate bleeding.  Cramping is moderate.   She does not take OTC analgesics for cramps.  No significant bloating/fluid retention, pelvic pain, or dyspareunia. No vaginal discharge   No breast tenderness, mass, nipple discharge, changes in size or contour, or abnormal cyclic discomfort.  She does not perform regular self breast exams.  Regular exercise: no   Diet: balanced     ROS   Denies any recent fevers or chills. No nausea or vomiting. No chest pains or shortness of breath.     Allergies   Allergen Reactions   • Codeine Vomiting       Current medicines (including changes today)  Current Outpatient Medications   Medication Sig Dispense Refill   • NIFEdipine (ADALAT CC) 30 MG CR tablet Take 1 tablet by mouth once daily 30 Tab 0   • norethindrone (ARIAN) 0.35 MG tablet Take 1 Tab by mouth every day.     • vitamin D (CHOLECALCIFEROL) 1000 UNIT Tab Take 1,000 Units by mouth every day.     • docusate sodium (COLACE) 100 MG Cap Take 100 mg by mouth 2 times a day.     • Prenatal MV-Min-Fe Fum-FA-DHA (PRENATAL 1 PO) Take  by mouth.       No current facility-administered medications for this visit.        Patient Active Problem List    Diagnosis Date Noted   •  (spontaneous vaginal delivery) 2019     Priority: High    • Prediabetes 02/11/2020   • Other hyperlipidemia 02/11/2020   • Pregnancy induced hypertension 11/12/2019   • Hypertension affecting pregnancy, third trimester 03/22/2019   • Mastalgia in female 02/24/2016   • Preventative health care 09/22/2014   • Fear of flying 09/22/2014   • Impaired fasting glucose 11/19/2012   • Vitamin D insufficiency 09/26/2011   • Delayed gastric emptying 04/24/2011       Family History   Problem Relation Age of Onset   • Cancer Father         Hx testicular   • Diabetes Father    • Heart Disease Father    • Hypertension Father    • Cancer Maternal Grandmother         MGGM Colon cancer 80s)       She  has a past medical history of Breastfeeding (infant) (3/26/2010), Delayed gastric emptying (4/24/2011), Fear of flying (9/22/2014), Impaired fasting glucose (11/19/2012), Iron deficiency anemia (9/26/2011), and Vitamin d deficiency (9/26/2011). She also has no past medical history of Asthma or Diabetes (HCC).  She  has no past surgical history on file.       Objective:   There were no vitals taken for this visit.    Physical Exam:  Constitutional: Alert, no distress, well-groomed.  Skin: No rashes in visible areas.  Eye: Round. Conjunctiva clear, lids normal. No icterus.   ENMT: Lips pink without lesions, good dentition, moist mucous membranes. Phonation normal.  Neck: No masses, no thyromegaly. Moves freely without pain.  Respiratory: Unlabored respiratory effort, no cough or audible wheeze  Psych: Alert and oriented x3, normal affect and mood.       Assessment and Plan:   The following treatment plan was discussed:     1. Well woman exam without gynecological exam  Fu repeat cbc, Hga1c. Uptd on preventive care, labs evaluated  - CBC WITH DIFFERENTIAL; Future  - HEMOGLOBIN A1C; Future    Follow-up: No follow-ups on file.

## 2021-02-01 NOTE — TELEPHONE ENCOUNTER
Received request via: Pharmacy    Was the patient seen in the last year in this department? Yes  1/14/21  Does the patient have an active prescription (recently filled or refills available) for medication(s) requested? No

## 2021-02-02 RX ORDER — NIFEDIPINE 30 MG
TABLET, EXTENDED RELEASE ORAL
Qty: 30 TAB | Refills: 1 | Status: SHIPPED | OUTPATIENT
Start: 2021-02-02 | End: 2021-03-29 | Stop reason: SDUPTHER

## 2021-03-29 RX ORDER — NIFEDIPINE 30 MG
30 TABLET, EXTENDED RELEASE ORAL DAILY
Qty: 30 TABLET | Refills: 1 | Status: SHIPPED | OUTPATIENT
Start: 2021-03-29

## 2021-05-11 ENCOUNTER — HOSPITAL ENCOUNTER (OUTPATIENT)
Dept: LAB | Facility: MEDICAL CENTER | Age: 41
End: 2021-05-11
Attending: FAMILY MEDICINE
Payer: COMMERCIAL

## 2021-05-11 LAB
ALBUMIN SERPL BCP-MCNC: 4.5 G/DL (ref 3.2–4.9)
ALBUMIN/GLOB SERPL: 1.3 G/DL
ALP SERPL-CCNC: 66 U/L (ref 30–99)
ALT SERPL-CCNC: 25 U/L (ref 2–50)
ANION GAP SERPL CALC-SCNC: 10 MMOL/L (ref 7–16)
AST SERPL-CCNC: 12 U/L (ref 12–45)
BILIRUB SERPL-MCNC: 0.3 MG/DL (ref 0.1–1.5)
BUN SERPL-MCNC: 13 MG/DL (ref 8–22)
CALCIUM SERPL-MCNC: 9.4 MG/DL (ref 8.5–10.5)
CHLORIDE SERPL-SCNC: 103 MMOL/L (ref 96–112)
CHOLEST SERPL-MCNC: 223 MG/DL (ref 100–199)
CO2 SERPL-SCNC: 24 MMOL/L (ref 20–33)
CREAT SERPL-MCNC: 0.73 MG/DL (ref 0.5–1.4)
EST. AVERAGE GLUCOSE BLD GHB EST-MCNC: 134 MG/DL
FASTING STATUS PATIENT QL REPORTED: NORMAL
GLOBULIN SER CALC-MCNC: 3.4 G/DL (ref 1.9–3.5)
GLUCOSE SERPL-MCNC: 125 MG/DL (ref 65–99)
HBA1C MFR BLD: 6.3 % (ref 4–5.6)
HDLC SERPL-MCNC: 51 MG/DL
LDLC SERPL CALC-MCNC: 140 MG/DL
POTASSIUM SERPL-SCNC: 4.4 MMOL/L (ref 3.6–5.5)
PROT SERPL-MCNC: 7.9 G/DL (ref 6–8.2)
SODIUM SERPL-SCNC: 137 MMOL/L (ref 135–145)
T4 FREE SERPL-MCNC: 1.18 NG/DL (ref 0.93–1.7)
TRIGL SERPL-MCNC: 159 MG/DL (ref 0–149)
TSH SERPL DL<=0.005 MIU/L-ACNC: 1.83 UIU/ML (ref 0.38–5.33)

## 2021-05-11 PROCEDURE — 80053 COMPREHEN METABOLIC PANEL: CPT

## 2021-05-11 PROCEDURE — 83036 HEMOGLOBIN GLYCOSYLATED A1C: CPT

## 2021-05-11 PROCEDURE — 36415 COLL VENOUS BLD VENIPUNCTURE: CPT

## 2021-05-11 PROCEDURE — 82306 VITAMIN D 25 HYDROXY: CPT

## 2021-05-11 PROCEDURE — 80061 LIPID PANEL: CPT

## 2021-05-11 PROCEDURE — 84443 ASSAY THYROID STIM HORMONE: CPT

## 2021-05-11 PROCEDURE — 84439 ASSAY OF FREE THYROXINE: CPT

## 2021-05-13 LAB — 25(OH)D3 SERPL-MCNC: 40 NG/ML (ref 30–80)

## 2022-06-03 ENCOUNTER — HOSPITAL ENCOUNTER (OUTPATIENT)
Dept: LAB | Facility: MEDICAL CENTER | Age: 42
End: 2022-06-03
Attending: FAMILY MEDICINE
Payer: COMMERCIAL

## 2022-06-03 LAB
25(OH)D3 SERPL-MCNC: 55 NG/ML (ref 30–100)
ALBUMIN SERPL BCP-MCNC: 4.5 G/DL (ref 3.2–4.9)
ALBUMIN/GLOB SERPL: 1.4 G/DL
ALP SERPL-CCNC: 71 U/L (ref 30–99)
ALT SERPL-CCNC: 19 U/L (ref 2–50)
ANION GAP SERPL CALC-SCNC: 11 MMOL/L (ref 7–16)
AST SERPL-CCNC: 12 U/L (ref 12–45)
BILIRUB SERPL-MCNC: 0.3 MG/DL (ref 0.1–1.5)
BUN SERPL-MCNC: 15 MG/DL (ref 8–22)
CALCIUM SERPL-MCNC: 9.7 MG/DL (ref 8.5–10.5)
CHLORIDE SERPL-SCNC: 103 MMOL/L (ref 96–112)
CHOLEST SERPL-MCNC: 199 MG/DL (ref 100–199)
CO2 SERPL-SCNC: 23 MMOL/L (ref 20–33)
CREAT SERPL-MCNC: 0.74 MG/DL (ref 0.5–1.4)
EST. AVERAGE GLUCOSE BLD GHB EST-MCNC: 154 MG/DL
FASTING STATUS PATIENT QL REPORTED: NORMAL
GFR SERPLBLD CREATININE-BSD FMLA CKD-EPI: 105 ML/MIN/1.73 M 2
GLOBULIN SER CALC-MCNC: 3.2 G/DL (ref 1.9–3.5)
GLUCOSE SERPL-MCNC: 113 MG/DL (ref 65–99)
HBA1C MFR BLD: 7 % (ref 4–5.6)
HDLC SERPL-MCNC: 39 MG/DL
LDLC SERPL CALC-MCNC: 116 MG/DL
POTASSIUM SERPL-SCNC: 4.5 MMOL/L (ref 3.6–5.5)
PROT SERPL-MCNC: 7.7 G/DL (ref 6–8.2)
SODIUM SERPL-SCNC: 137 MMOL/L (ref 135–145)
TRIGL SERPL-MCNC: 219 MG/DL (ref 0–149)

## 2022-06-03 PROCEDURE — 82306 VITAMIN D 25 HYDROXY: CPT

## 2022-06-03 PROCEDURE — 80053 COMPREHEN METABOLIC PANEL: CPT

## 2022-06-03 PROCEDURE — 80061 LIPID PANEL: CPT

## 2022-06-03 PROCEDURE — 83036 HEMOGLOBIN GLYCOSYLATED A1C: CPT

## 2022-06-03 PROCEDURE — 36415 COLL VENOUS BLD VENIPUNCTURE: CPT

## 2022-09-07 ENCOUNTER — HOSPITAL ENCOUNTER (OUTPATIENT)
Dept: LAB | Facility: MEDICAL CENTER | Age: 42
End: 2022-09-07
Attending: FAMILY MEDICINE
Payer: COMMERCIAL

## 2022-09-07 LAB
ALBUMIN SERPL BCP-MCNC: 4.6 G/DL (ref 3.2–4.9)
ALBUMIN/GLOB SERPL: 1.6 G/DL
ALP SERPL-CCNC: 65 U/L (ref 30–99)
ALT SERPL-CCNC: 10 U/L (ref 2–50)
ANION GAP SERPL CALC-SCNC: 10 MMOL/L (ref 7–16)
AST SERPL-CCNC: 9 U/L (ref 12–45)
BILIRUB SERPL-MCNC: 0.3 MG/DL (ref 0.1–1.5)
BUN SERPL-MCNC: 16 MG/DL (ref 8–22)
CALCIUM SERPL-MCNC: 9.5 MG/DL (ref 8.5–10.5)
CHLORIDE SERPL-SCNC: 101 MMOL/L (ref 96–112)
CHOLEST SERPL-MCNC: 190 MG/DL (ref 100–199)
CO2 SERPL-SCNC: 24 MMOL/L (ref 20–33)
CREAT SERPL-MCNC: 0.65 MG/DL (ref 0.5–1.4)
CREAT UR-MCNC: 89.02 MG/DL
EST. AVERAGE GLUCOSE BLD GHB EST-MCNC: 131 MG/DL
FASTING STATUS PATIENT QL REPORTED: NORMAL
GFR SERPLBLD CREATININE-BSD FMLA CKD-EPI: 114 ML/MIN/1.73 M 2
GLOBULIN SER CALC-MCNC: 2.9 G/DL (ref 1.9–3.5)
GLUCOSE SERPL-MCNC: 122 MG/DL (ref 65–99)
HBA1C MFR BLD: 6.2 % (ref 4–5.6)
HDLC SERPL-MCNC: 48 MG/DL
LDLC SERPL CALC-MCNC: 103 MG/DL
MICROALBUMIN UR-MCNC: <1.2 MG/DL
MICROALBUMIN/CREAT UR: NORMAL MG/G (ref 0–30)
POTASSIUM SERPL-SCNC: 4.9 MMOL/L (ref 3.6–5.5)
PROT SERPL-MCNC: 7.5 G/DL (ref 6–8.2)
SODIUM SERPL-SCNC: 135 MMOL/L (ref 135–145)
TRIGL SERPL-MCNC: 194 MG/DL (ref 0–149)

## 2022-09-07 PROCEDURE — 80053 COMPREHEN METABOLIC PANEL: CPT

## 2022-09-07 PROCEDURE — 83036 HEMOGLOBIN GLYCOSYLATED A1C: CPT

## 2022-09-07 PROCEDURE — 80061 LIPID PANEL: CPT

## 2022-09-07 PROCEDURE — 82043 UR ALBUMIN QUANTITATIVE: CPT

## 2022-09-07 PROCEDURE — 82570 ASSAY OF URINE CREATININE: CPT

## 2022-09-07 PROCEDURE — 36415 COLL VENOUS BLD VENIPUNCTURE: CPT

## 2024-12-17 ENCOUNTER — HOSPITAL ENCOUNTER (OUTPATIENT)
Dept: LAB | Facility: MEDICAL CENTER | Age: 44
End: 2024-12-17
Attending: NURSE PRACTITIONER
Payer: COMMERCIAL

## 2024-12-17 LAB
ALBUMIN SERPL BCP-MCNC: 4.5 G/DL (ref 3.2–4.9)
ALBUMIN/GLOB SERPL: 1.6 G/DL
ALP SERPL-CCNC: 64 U/L (ref 30–99)
ALT SERPL-CCNC: 14 U/L (ref 2–50)
ANION GAP SERPL CALC-SCNC: 12 MMOL/L (ref 7–16)
AST SERPL-CCNC: 11 U/L (ref 12–45)
BILIRUB SERPL-MCNC: 0.3 MG/DL (ref 0.1–1.5)
BUN SERPL-MCNC: 13 MG/DL (ref 8–22)
CALCIUM ALBUM COR SERPL-MCNC: 8.7 MG/DL (ref 8.5–10.5)
CALCIUM SERPL-MCNC: 9.1 MG/DL (ref 8.5–10.5)
CHLORIDE SERPL-SCNC: 104 MMOL/L (ref 96–112)
CHOLEST SERPL-MCNC: 181 MG/DL (ref 100–199)
CO2 SERPL-SCNC: 24 MMOL/L (ref 20–33)
CREAT SERPL-MCNC: 0.68 MG/DL (ref 0.5–1.4)
EST. AVERAGE GLUCOSE BLD GHB EST-MCNC: 120 MG/DL
GFR SERPLBLD CREATININE-BSD FMLA CKD-EPI: 111 ML/MIN/1.73 M 2
GLOBULIN SER CALC-MCNC: 2.9 G/DL (ref 1.9–3.5)
GLUCOSE SERPL-MCNC: 89 MG/DL (ref 65–99)
HBA1C MFR BLD: 5.8 % (ref 4–5.6)
HDLC SERPL-MCNC: 52 MG/DL
LDLC SERPL CALC-MCNC: 107 MG/DL
POTASSIUM SERPL-SCNC: 4.5 MMOL/L (ref 3.6–5.5)
PROT SERPL-MCNC: 7.4 G/DL (ref 6–8.2)
SODIUM SERPL-SCNC: 140 MMOL/L (ref 135–145)
TRIGL SERPL-MCNC: 110 MG/DL (ref 0–149)

## 2024-12-17 PROCEDURE — 80061 LIPID PANEL: CPT

## 2024-12-17 PROCEDURE — 82043 UR ALBUMIN QUANTITATIVE: CPT

## 2024-12-17 PROCEDURE — 83036 HEMOGLOBIN GLYCOSYLATED A1C: CPT

## 2024-12-17 PROCEDURE — 82570 ASSAY OF URINE CREATININE: CPT

## 2024-12-17 PROCEDURE — 36415 COLL VENOUS BLD VENIPUNCTURE: CPT

## 2024-12-17 PROCEDURE — 80053 COMPREHEN METABOLIC PANEL: CPT

## 2024-12-19 LAB
COLLECT DURATION TIME SPEC: NORMAL HR
CREAT 24H UR-MCNC: 89 MG/DL
MICROALBUMIN 24H UR-MCNC: <0.3 MG/DL
MICROALBUMIN/CREAT 24H UR: <3 MG/G (ref 0–30)
SPECIMEN VOL ?TM UR: NORMAL ML

## 2025-01-28 ENCOUNTER — APPOINTMENT (OUTPATIENT)
Dept: URBAN - METROPOLITAN AREA CLINIC 15 | Facility: CLINIC | Age: 45
Setting detail: DERMATOLOGY
End: 2025-01-28

## 2025-01-28 DIAGNOSIS — L73.9 FOLLICULAR DISORDER, UNSPECIFIED: ICD-10-CM

## 2025-01-28 DIAGNOSIS — L57.0 ACTINIC KERATOSIS: ICD-10-CM | Status: INADEQUATELY CONTROLLED

## 2025-01-28 DIAGNOSIS — B07.8 OTHER VIRAL WARTS: ICD-10-CM

## 2025-01-28 DIAGNOSIS — L73.8 OTHER SPECIFIED FOLLICULAR DISORDERS: ICD-10-CM

## 2025-01-28 DIAGNOSIS — L81.4 OTHER MELANIN HYPERPIGMENTATION: ICD-10-CM

## 2025-01-28 DIAGNOSIS — L738 OTHER SPECIFIED DISEASES OF HAIR AND HAIR FOLLICLES: ICD-10-CM

## 2025-01-28 DIAGNOSIS — L72.0 EPIDERMAL CYST: ICD-10-CM

## 2025-01-28 DIAGNOSIS — D18.0 HEMANGIOMA: ICD-10-CM

## 2025-01-28 DIAGNOSIS — D22 MELANOCYTIC NEVI: ICD-10-CM

## 2025-01-28 DIAGNOSIS — Z71.89 OTHER SPECIFIED COUNSELING: ICD-10-CM

## 2025-01-28 DIAGNOSIS — L85.3 XEROSIS CUTIS: ICD-10-CM

## 2025-01-28 DIAGNOSIS — L82.1 OTHER SEBORRHEIC KERATOSIS: ICD-10-CM

## 2025-01-28 DIAGNOSIS — L663 OTHER SPECIFIED DISEASES OF HAIR AND HAIR FOLLICLES: ICD-10-CM

## 2025-01-28 PROBLEM — D18.01 HEMANGIOMA OF SKIN AND SUBCUTANEOUS TISSUE: Status: ACTIVE | Noted: 2025-01-28

## 2025-01-28 PROBLEM — L02.02 FURUNCLE OF FACE: Status: ACTIVE | Noted: 2025-01-28

## 2025-01-28 PROBLEM — L02.222 FURUNCLE OF BACK [ANY PART, EXCEPT BUTTOCK]: Status: ACTIVE | Noted: 2025-01-28

## 2025-01-28 PROBLEM — D22.71 MELANOCYTIC NEVI OF RIGHT LOWER LIMB, INCLUDING HIP: Status: ACTIVE | Noted: 2025-01-28

## 2025-01-28 PROCEDURE — ? TREATMENT REGIMEN

## 2025-01-28 PROCEDURE — 17000 DESTRUCT PREMALG LESION: CPT

## 2025-01-28 PROCEDURE — ? SUNSCREEN RECOMMENDATIONS

## 2025-01-28 PROCEDURE — 99203 OFFICE O/P NEW LOW 30 MIN: CPT | Mod: 25

## 2025-01-28 PROCEDURE — ? ADDITIONAL NOTES

## 2025-01-28 PROCEDURE — ? PRESCRIPTION

## 2025-01-28 PROCEDURE — ? COUNSELING

## 2025-01-28 PROCEDURE — ? LIQUID NITROGEN

## 2025-01-28 PROCEDURE — ? MEDICATION COUNSELING

## 2025-01-28 RX ORDER — CLINDAMYCIN PHOSPHATE 10 MG/ML
1 LOTION TOPICAL QD
Qty: 60 | Refills: 12 | Status: ERX | COMMUNITY
Start: 2025-01-28

## 2025-01-28 RX ADMIN — CLINDAMYCIN PHOSPHATE 1: 10 LOTION TOPICAL at 00:00

## 2025-01-28 ASSESSMENT — LOCATION SIMPLE DESCRIPTION DERM
LOCATION SIMPLE: LEFT HAND
LOCATION SIMPLE: LEFT UPPER ARM
LOCATION SIMPLE: RIGHT HAND
LOCATION SIMPLE: CHEST
LOCATION SIMPLE: LEFT FOREARM
LOCATION SIMPLE: RIGHT FOREHEAD
LOCATION SIMPLE: RIGHT CALF
LOCATION SIMPLE: GLABELLA
LOCATION SIMPLE: LEFT CHEEK
LOCATION SIMPLE: RIGHT UPPER BACK

## 2025-01-28 ASSESSMENT — LOCATION DETAILED DESCRIPTION DERM
LOCATION DETAILED: RIGHT MEDIAL FOREHEAD
LOCATION DETAILED: RIGHT PROXIMAL CALF
LOCATION DETAILED: LEFT PROXIMAL DORSAL FOREARM
LOCATION DETAILED: GLABELLA
LOCATION DETAILED: RIGHT MEDIAL INFERIOR CHEST
LOCATION DETAILED: LEFT ANTERIOR DISTAL UPPER ARM
LOCATION DETAILED: LEFT INFERIOR CENTRAL MALAR CHEEK
LOCATION DETAILED: RIGHT SUPERIOR MEDIAL UPPER BACK
LOCATION DETAILED: LEFT RADIAL DORSAL HAND
LOCATION DETAILED: LEFT ULNAR DORSAL HAND
LOCATION DETAILED: RIGHT RADIAL DORSAL HAND

## 2025-01-28 ASSESSMENT — LOCATION ZONE DERM
LOCATION ZONE: TRUNK
LOCATION ZONE: ARM
LOCATION ZONE: FACE
LOCATION ZONE: LEG
LOCATION ZONE: HAND

## 2025-01-28 NOTE — PROCEDURE: TREATMENT REGIMEN
Detail Level: Detailed
Initiate Treatment: clindamycin 1 % lotion QD\\nQuantity: 60.0 ml  Days Supply: 15\\nSig: Apply to affected areas once daily in morning after washing area with benzoyl peroxide. Affected areas: face and back. Rx only good for 15 days\\n\\nOTC Benzoyl Peroxide 10% wash: Wash affected areas once daily in the shower (handout given)

## 2025-01-28 NOTE — PROCEDURE: LIQUID NITROGEN
Number Of Freeze-Thaw Cycles: 2 freeze-thaw cycles
Application Tool (Optional): Cry-AC
Show Aperture Variable?: No
Show Applicator Variable?: Yes
Consent: The patient's consent was obtained including but not limited to risks of crusting, scabbing, blistering, scarring, darker or lighter pigmentary change, recurrence, incomplete removal and infection.
Detail Level: Zone
Post-Care Instructions: I reviewed with the patient in detail post-care instructions. Patient is to wear sunprotection, and avoid picking at any of the treated lesions. Pt may apply Vaseline to crusted or scabbing areas.
Duration Of Freeze Thaw-Cycle (Seconds): 3

## 2025-01-28 NOTE — PROCEDURE: MIPS QUALITY
Detail Level: Simple
Quality 431: Preventive Care And Screening: Unhealthy Alcohol Use - Screening: Patient not identified as an unhealthy alcohol user when screened for unhealthy alcohol use using a systematic screening method
Quality 130: Documentation Of Current Medications In The Medical Record: Current Medications Documented
Quality 226: Preventive Care And Screening: Tobacco Use: Screening And Cessation Intervention: Patient screened for tobacco use and is an ex/non-smoker
This document is complete and the patient is ready for discharge.

## 2025-01-28 NOTE — PROCEDURE: ADDITIONAL NOTES
Additional Notes: Recommended: \\n- Moisturizing Cream, (Cetaphil handout given) \\n- Gentle Cleanser (Cetaphil handout given) \\n- Stop Hand 
Detail Level: Detailed
Render Risk Assessment In Note?: no
Additional Notes: Recommended a consult with Letty Mott RN

## 2025-01-28 NOTE — PROCEDURE: MEDICATION COUNSELING
Otezla Pregnancy And Lactation Text: This medication is Pregnancy Category C and it isn't known if it is safe during pregnancy. It is unknown if it is excreted in breast milk.
Hyrimoz Pregnancy And Lactation Text: This medication is Pregnancy Category B and is considered safe during pregnancy. It is unknown if this medication is excreted in breast milk.
Metronidazole Counseling:  I discussed with the patient the risks of metronidazole including but not limited to seizures, nausea/vomiting, a metallic taste in the mouth, nausea/vomiting and severe allergy.
Thalidomide Pregnancy And Lactation Text: This medication is Pregnancy Category X and is absolutely contraindicated during pregnancy. It is unknown if it is excreted in breast milk.
Olumiant Counseling: I discussed with the patient the risks of Olumiant therapy including but not limited to upper respiratory tract infections, shingles, cold sores, and nausea. Live vaccines should be avoided.  This medication has been linked to serious infections; higher rate of mortality; malignancy and lymphoproliferative disorders; major adverse cardiovascular events; thrombosis; gastrointestinal perforations; neutropenia; lymphopenia; anemia; liver enzyme elevations; and lipid elevations.
Benzoyl Peroxide Counseling: Patient counseled that medicine may cause skin irritation and bleach clothing.  In the event of skin irritation, the patient was advised to reduce the amount of the drug applied or use it less frequently.   The patient verbalized understanding of the proper use and possible adverse effects of benzoyl peroxide.  All of the patient's questions and concerns were addressed.
Spironolactone Pregnancy And Lactation Text: This medication can cause feminization of the male fetus and should be avoided during pregnancy. The active metabolite is also found in breast milk.
Benzoyl Peroxide Pregnancy And Lactation Text: This medication is Pregnancy Category C. It is unknown if benzoyl peroxide is excreted in breast milk.
Dapsone Pregnancy And Lactation Text: This medication is Pregnancy Category C and is not considered safe during pregnancy or breast feeding.
Picato Counseling:  I discussed with the patient the risks of Picato including but not limited to erythema, scaling, itching, weeping, crusting, and pain.
Sarecycline Pregnancy And Lactation Text: This medication is Pregnancy Category D and not consider safe during pregnancy. It is also excreted in breast milk.
Methotrexate Counseling:  Patient counseled regarding adverse effects of methotrexate including but not limited to nausea, vomiting, abnormalities in liver function tests. Patients may develop mouth sores, rash, diarrhea, and abnormalities in blood counts. The patient understands that monitoring is required including LFT's and blood counts.  There is a rare possibility of scarring of the liver and lung problems that can occur when taking methotrexate. Persistent nausea, loss of appetite, pale stools, dark urine, cough, and shortness of breath should be reported immediately. Patient advised to discontinue methotrexate treatment at least three months before attempting to become pregnant.  I discussed the need for folate supplements while taking methotrexate.  These supplements can decrease side effects during methotrexate treatment. The patient verbalized understanding of the proper use and possible adverse effects of methotrexate.  All of the patient's questions and concerns were addressed.
Methotrexate Pregnancy And Lactation Text: This medication is Pregnancy Category X and is known to cause fetal harm. This medication is excreted in breast milk.
Oxybutynin Counseling:  I discussed with the patient the risks of oxybutynin including but not limited to skin rash, drowsiness, dry mouth, difficulty urinating, and blurred vision.
Ilumya Counseling: I discussed with the patient the risks of tildrakizumab including but not limited to immunosuppression, malignancy, posterior leukoencephalopathy syndrome, and serious infections.  The patient understands that monitoring is required including a PPD at baseline and must alert us or the primary physician if symptoms of infection or other concerning signs are noted.
Metronidazole Pregnancy And Lactation Text: This medication is Pregnancy Category B and considered safe during pregnancy.  It is also excreted in breast milk.
Tranexamic Acid Counseling:  Patient advised of the small risk of bleeding problems with tranexamic acid. They were also instructed to call if they developed any nausea, vomiting or diarrhea. All of the patient's questions and concerns were addressed.
Olumiant Pregnancy And Lactation Text: Based on animal studies, Olumiant may cause embryo-fetal harm when administered to pregnant women.  The medication should not be used in pregnancy.  Breastfeeding is not recommended during treatment.
Wartpeel Counseling:  I discussed with the patient the risks of Wartpeel including but not limited to erythema, scaling, itching, weeping, crusting, and pain.
Gabapentin Counseling: I discussed with the patient the risks of gabapentin including but not limited to dizziness, somnolence, fatigue and ataxia.
Rinvoq Counseling: I discussed with the patient the risks of Rinvoq therapy including but not limited to upper respiratory tract infections, shingles, cold sores, bronchitis, nausea, cough, fever, acne, and headache. Live vaccines should be avoided.  This medication has been linked to serious infections; higher rate of mortality; malignancy and lymphoproliferative disorders; major adverse cardiovascular events; thrombosis; thrombocytopenia, anemia, and neutropenia; lipid elevations; liver enzyme elevations; and gastrointestinal perforations.
Carac Counseling:  I discussed with the patient the risks of Carac including but not limited to erythema, scaling, itching, weeping, crusting, and pain.
Siliq Pregnancy And Lactation Text: The risk during pregnancy and breastfeeding is uncertain with this medication.
Tetracycline Counseling: Patient counseled regarding possible photosensitivity and increased risk for sunburn.  Patient instructed to avoid sunlight, if possible.  When exposed to sunlight, patients should wear protective clothing, sunglasses, and sunscreen.  The patient was instructed to call the office immediately if the following severe adverse effects occur:  hearing changes, easy bruising/bleeding, severe headache, or vision changes.  The patient verbalized understanding of the proper use and possible adverse effects of tetracycline.  All of the patient's questions and concerns were addressed. Patient understands to avoid pregnancy while on therapy due to potential birth defects.
Picato Pregnancy And Lactation Text: This medication is Pregnancy Category C. It is unknown if this medication is excreted in breast milk.
Prednisone Counseling:  I discussed with the patient the risks of prolonged use of prednisone including but not limited to weight gain, insomnia, osteoporosis, mood changes, diabetes, susceptibility to infection, glaucoma and high blood pressure.  In cases where prednisone use is prolonged, patients should be monitored with blood pressure checks, serum glucose levels and an eye exam.  Additionally, the patient may need to be placed on GI prophylaxis, PCP prophylaxis, and calcium and vitamin D supplementation and/or a bisphosphonate.  The patient verbalized understanding of the proper use and the possible adverse effects of prednisone.  All of the patient's questions and concerns were addressed.
Simlandi Counseling:  I discussed with the patient the risks of adalimumab including but not limited to myelosuppression, immunosuppression, autoimmune hepatitis, demyelinating diseases, lymphoma, and serious infections.  The patient understands that monitoring is required including a PPD at baseline and must alert us or the primary physician if symptoms of infection or other concerning signs are noted.
Oxybutynin Pregnancy And Lactation Text: This medication is Pregnancy Category B and is considered safe during pregnancy. It is unknown if it is excreted in breast milk.
Protopic Counseling: Patient may experience a mild burning sensation during topical application. Protopic is not approved in children less than 2 years of age. There have been case reports of hematologic and skin malignancies in patients using topical calcineurin inhibitors although causality is questionable.
Finasteride Male Counseling: Finasteride Counseling:  I discussed with the patient the risks of use of finasteride including but not limited to decreased libido, decreased ejaculate volume, gynecomastia, and depression. Women should not handle medication.  All of the patient's questions and concerns were addressed.
Tranexamic Acid Pregnancy And Lactation Text: It is unknown if this medication is safe during pregnancy or breast feeding.
Detail Level: Simple
Minocycline Counseling: Patient advised regarding possible photosensitivity and discoloration of the teeth, skin, lips, tongue and gums.  Patient instructed to avoid sunlight, if possible.  When exposed to sunlight, patients should wear protective clothing, sunglasses, and sunscreen.  The patient was instructed to call the office immediately if the following severe adverse effects occur:  hearing changes, easy bruising/bleeding, severe headache, or vision changes.  The patient verbalized understanding of the proper use and possible adverse effects of minocycline.  All of the patient's questions and concerns were addressed.
Valtrex Counseling: I discussed with the patient the risks of valacyclovir including but not limited to kidney damage, nausea, vomiting and severe allergy.  The patient understands that if the infection seems to be worsening or is not improving, they are to call.
Rinvoq Pregnancy And Lactation Text: Based on animal studies, Rinvoq may cause embryo-fetal harm when administered to pregnant women.  The medication should not be used in pregnancy.  Breastfeeding is not recommended during treatment and for 6 days after the last dose.
Gabapentin Pregnancy And Lactation Text: This medication is Pregnancy Category C and isn't considered safe during pregnancy. It is excreted in breast milk.
Carac Pregnancy And Lactation Text: This medication is Pregnancy Category X and contraindicated in pregnancy and in women who may become pregnant. It is unknown if this medication is excreted in breast milk.
Glycopyrrolate Counseling:  I discussed with the patient the risks of glycopyrrolate including but not limited to skin rash, drowsiness, dry mouth, difficulty urinating, and blurred vision.
Propranolol Counseling:  I discussed with the patient the risks of propranolol including but not limited to low heart rate, low blood pressure, low blood sugar, restlessness and increased cold sensitivity. They should call the office if they experience any of these side effects.
Finasteride Female Counseling: Finasteride Counseling:  I discussed with the patient the risks of use of finasteride including but not limited to decreased libido and sexual dysfunction. Explained the teratogenic nature of the medication and stressed the importance of not getting pregnant during treatment. All of the patient's questions and concerns were addressed.
Prednisone Pregnancy And Lactation Text: This medication is Pregnancy Category C and it isn't know if it is safe during pregnancy. This medication is excreted in breast milk.
Protopic Pregnancy And Lactation Text: This medication is Pregnancy Category C. It is unknown if this medication is excreted in breast milk when applied topically.
Infliximab Counseling:  I discussed with the patient the risks of infliximab including but not limited to myelosuppression, immunosuppression, autoimmune hepatitis, demyelinating diseases, lymphoma, and serious infections.  The patient understands that monitoring is required including a PPD at baseline and must alert us or the primary physician if symptoms of infection or other concerning signs are noted.
Include Pregnancy/Lactation Warning?: No
Valtrex Pregnancy And Lactation Text: this medication is Pregnancy Category B and is considered safe during pregnancy. This medication is not directly found in breast milk but it's metabolite acyclovir is present.
Finasteride Pregnancy And Lactation Text: This medication is absolutely contraindicated during pregnancy. It is unknown if it is excreted in breast milk.
Quinolones Counseling:  I discussed with the patient the risks of fluoroquinolones including but not limited to GI upset, allergic reaction, drug rash, diarrhea, dizziness, photosensitivity, yeast infections, liver function test abnormalities, tendonitis/tendon rupture.
Calcipotriene Counseling:  I discussed with the patient the risks of calcipotriene including but not limited to erythema, scaling, itching, and irritation.
Aklief counseling:  Patient advised to apply a pea-sized amount only at bedtime and wait 30 minutes after washing their face before applying.  If too drying, patient may add a non-comedogenic moisturizer.  The most commonly reported side effects including irritation, redness, scaling, dryness, stinging, burning, itching, and increased risk of sunburn.  The patient verbalized understanding of the proper use and possible adverse effects of retinoids.  All of the patient's questions and concerns were addressed.
Clofazimine Counseling:  I discussed with the patient the risks of clofazimine including but not limited to skin and eye pigmentation, liver damage, nausea/vomiting, gastrointestinal bleeding and allergy.
Taltz Counseling: I discussed with the patient the risks of ixekizumab including but not limited to immunosuppression, serious infections, worsening of inflammatory bowel disease and drug reactions.  The patient understands that monitoring is required including a PPD at baseline and must alert us or the primary physician if symptoms of infection or other concerning signs are noted.
Semaglutide Pregnancy And Lactation Text: The fetal risk of this medication is unknown and taking while pregnant is not recommended. It is unknown if this medication is present in breast milk.
Zoryve Counseling:  I discussed with the patient that Zoryve is not for use in the eyes, mouth or vagina. The most commonly reported side effects include diarrhea, headache, insomnia, application site pain, upper respiratory tract infections, and urinary tract infections.  All of the patient's questions and concerns were addressed.
Olanzapine Counseling- I discussed with the patient the common side effects of olanzapine including but are not limited to: lack of energy, dry mouth, increased appetite, sleepiness, tremor, constipation, dizziness, changes in behavior, or restlessness.  Explained that teenagers are more likely to experience headaches, abdominal pain, pain in the arms or legs, tiredness, and sleepiness.  Serious side effects include but are not limited: increased risk of death in elderly patients who are confused, have memory loss, or dementia-related psychosis; hyperglycemia; increased cholesterol and triglycerides; and weight gain.
Minoxidil Pregnancy And Lactation Text: This medication has not been assigned a Pregnancy Risk Category but animal studies failed to show danger with the topical medication. It is unknown if the medication is excreted in breast milk.
Cellcept Pregnancy And Lactation Text: This medication is Pregnancy Category D and isn't considered safe during pregnancy. It is unknown if this medication is excreted in breast milk.
Sotyktu Counseling:  I discussed the most common side effects of Sotyktu including: common cold, sore throat, sinus infections, cold sores, canker sores, folliculitis, and acne.? I also discussed more serious side effects of Sotyktu including but not limited to: serious allergic reactions; increased risk for infections such as TB; cancers such as lymphomas; rhabdomyolysis and elevated CPK; and elevated triglycerides and liver enzymes.?
Enbrel Counseling:  I discussed with the patient the risks of etanercept including but not limited to myelosuppression, immunosuppression, autoimmune hepatitis, demyelinating diseases, lymphoma, and infections.  The patient understands that monitoring is required including a PPD at baseline and must alert us or the primary physician if symptoms of infection or other concerning signs are noted.
Sotyktu Pregnancy And Lactation Text: There is insufficient data to evaluate whether or not Sotyktu is safe to use during pregnancy.? ?It is not known if Sotyktu passes into breast milk and whether or not it is safe to use when breastfeeding.??
Olanzapine Pregnancy And Lactation Text: This medication is pregnancy category C.   There are no adequate and well controlled trials with olanzapine in pregnant females.  Olanzapine should be used during pregnancy only if the potential benefit justifies the potential risk to the fetus.   In a study in lactating healthy women, olanzapine was excreted in breast milk.  It is recommended that women taking olanzapine should not breast feed.
Odomzo Counseling- I discussed with the patient the risks of Odomzo including but not limited to nausea, vomiting, diarrhea, constipation, weight loss, changes in the sense of taste, decreased appetite, muscle spasms, and hair loss.  The patient verbalized understanding of the proper use and possible adverse effects of Odomzo.  All of the patient's questions and concerns were addressed.
Doxycycline Counseling:  Patient counseled regarding possible photosensitivity and increased risk for sunburn.  Patient instructed to avoid sunlight, if possible.  When exposed to sunlight, patients should wear protective clothing, sunglasses, and sunscreen.  The patient was instructed to call the office immediately if the following severe adverse effects occur:  hearing changes, easy bruising/bleeding, severe headache, or vision changes.  The patient verbalized understanding of the proper use and possible adverse effects of doxycycline.  All of the patient's questions and concerns were addressed.
Cyclophosphamide Counseling:  I discussed with the patient the risks of cyclophosphamide including but not limited to hair loss, hormonal abnormalities, decreased fertility, abdominal pain, diarrhea, nausea and vomiting, bone marrow suppression and infection. The patient understands that monitoring is required while taking this medication.
Terbinafine Counseling: Patient counseling regarding adverse effects of terbinafine including but not limited to headache, diarrhea, rash, upset stomach, liver function test abnormalities, itching, taste/smell disturbance, nausea, abdominal pain, and flatulence.  There is a rare possibility of liver failure that can occur when taking terbinafine.  The patient understands that a baseline LFT and kidney function test may be required. The patient verbalized understanding of the proper use and possible adverse effects of terbinafine.  All of the patient's questions and concerns were addressed.
Cibinqo Counseling: I discussed with the patient the risks of Cibinqo therapy including but not limited to common cold, nausea, headache, cold sores, increased blood CPK levels, dizziness, UTIs, fatigue, acne, and vomitting. Live vaccines should be avoided.  This medication has been linked to serious infections; higher rate of mortality; malignancy and lymphoproliferative disorders; major adverse cardiovascular events; thrombosis; thrombocytopenia and lymphopenia; lipid elevations; and retinal detachment.
Isotretinoin Pregnancy And Lactation Text: This medication is Pregnancy Category X and is considered extremely dangerous during pregnancy. It is unknown if it is excreted in breast milk.
Cimzia Counseling:  I discussed with the patient the risks of Cimzia including but not limited to immunosuppression, allergic reactions and infections.  The patient understands that monitoring is required including a PPD at baseline and must alert us or the primary physician if symptoms of infection or other concerning signs are noted.
High Dose Vitamin A Counseling: Side effects reviewed, pt to contact office should one occur.
Aklief Pregnancy And Lactation Text: It is unknown if this medication is safe to use during pregnancy.  It is unknown if this medication is excreted in breast milk.  Breastfeeding women should use the topical cream on the smallest area of the skin for the shortest time needed while breastfeeding.  Do not apply to nipple and areola.
Mirvaso Counseling: Mirvaso is a topical medication which can decrease superficial blood flow where applied. Side effects are uncommon and include stinging, redness and allergic reactions.
Zoryve Pregnancy And Lactation Text: It is unknown if this medication can cause problems during pregnancy and breastfeeding.
Wegovy Counseling: I reviewed the possible side effects including: thyroid tumors, kidney disease, gallbladder disease, abdominal pain, constipation, diarrhea, nausea, vomiting and pancreatitis. Do not take this medication if you have a history or family history of multiple endocrine neoplasia syndrome type 2. Side effects reviewed, pt to contact office should one occur.
Xeljanz Counseling: I discussed with the patient the risks of Xeljanz therapy including increased risk of infection, liver issues, headache, diarrhea, or cold symptoms. Live vaccines should be avoided. They were instructed to call if they have any problems.
Oral Minoxidil Counseling- I discussed with the patient the risks of oral minoxidil including but not limited to shortness of breath, swelling of the feet or ankles, dizziness, lightheadedness, unwanted hair growth and allergic reaction.  The patient verbalized understanding of the proper use and possible adverse effects of oral minoxidil.  All of the patient's questions and concerns were addressed.
Cyclophosphamide Pregnancy And Lactation Text: This medication is Pregnancy Category D and it isn't considered safe during pregnancy. This medication is excreted in breast milk.
Cantharidin Pregnancy And Lactation Text: This medication has not been proven safe during pregnancy. It is unknown if this medication is excreted in breast milk.
Terbinafine Pregnancy And Lactation Text: This medication is Pregnancy Category B and is considered safe during pregnancy. It is also excreted in breast milk and breast feeding isn't recommended.
Humira Counseling:  I discussed with the patient the risks of adalimumab including but not limited to myelosuppression, immunosuppression, autoimmune hepatitis, demyelinating diseases, lymphoma, and serious infections.  The patient understands that monitoring is required including a PPD at baseline and must alert us or the primary physician if symptoms of infection or other concerning signs are noted.
Doxycycline Pregnancy And Lactation Text: This medication is Pregnancy Category D and not consider safe during pregnancy. It is also excreted in breast milk but is considered safe for shorter treatment courses.
Cibinqo Pregnancy And Lactation Text: It is unknown if this medication will adversely affect pregnancy or breast feeding.  You should not take this medication if you are currently pregnant or planning a pregnancy or while breastfeeding.
Hydroquinone Counseling:  Patient advised that medication may result in skin irritation, lightening (hypopigmentation), dryness, and burning.  In the event of skin irritation, the patient was advised to reduce the amount of the drug applied or use it less frequently.  Rarely, spots that are treated with hydroquinone can become darker (pseudoochronosis).  Should this occur, patient instructed to stop medication and call the office. The patient verbalized understanding of the proper use and possible adverse effects of hydroquinone.  All of the patient's questions and concerns were addressed.
Tremfya Counseling: I discussed with the patient the risks of guselkumab including but not limited to immunosuppression, serious infections, and drug reactions.  The patient understands that monitoring is required including a PPD at baseline and must alert us or the primary physician if symptoms of infection or other concerning signs are noted.
High Dose Vitamin A Pregnancy And Lactation Text: High dose vitamin A therapy is contraindicated during pregnancy and breast feeding.
Litfulo Counseling: I discussed with the patient the risks of Litfulo therapy including but not limited to upper respiratory tract infections, shingles, cold sores, and nausea. Live vaccines should be avoided.  This medication has been linked to serious infections; higher rate of mortality; malignancy and lymphoproliferative disorders; major adverse cardiovascular events; thrombosis; gastrointestinal perforations; neutropenia; lymphopenia; anemia; liver enzyme elevations; and lipid elevations.
Colchicine Counseling:  Patient counseled regarding adverse effects including but not limited to stomach upset (nausea, vomiting, stomach pain, or diarrhea).  Patient instructed to limit alcohol consumption while taking this medication.  Colchicine may reduce blood counts especially with prolonged use.  The patient understands that monitoring of kidney function and blood counts may be required, especially at baseline. The patient verbalized understanding of the proper use and possible adverse effects of colchicine.  All of the patient's questions and concerns were addressed.
Azelaic Acid Counseling: Patient counseled that medicine may cause skin irritation and to avoid applying near the eyes.  In the event of skin irritation, the patient was advised to reduce the amount of the drug applied or use it less frequently.   The patient verbalized understanding of the proper use and possible adverse effects of azelaic acid.  All of the patient's questions and concerns were addressed.
Zyclara Counseling:  I discussed with the patient the risks of imiquimod including but not limited to erythema, scaling, itching, weeping, crusting, and pain.  Patient understands that the inflammatory response to imiquimod is variable from person to person and was educated regarded proper titration schedule.  If flu-like symptoms develop, patient knows to discontinue the medication and contact us.
Rifampin Counseling: I discussed with the patient the risks of rifampin including but not limited to liver damage, kidney damage, red-orange body fluids, nausea/vomiting and severe allergy.
Mirvaso Pregnancy And Lactation Text: This medication has not been assigned a Pregnancy Risk Category. It is unknown if the medication is excreted in breast milk.
Opzelura Counseling:  I discussed with the patient the risks of Opzelura including but not limited to nasopharngitis, bronchitis, ear infection, eosinophila, hives, diarrhea, folliculitis, tonsillitis, and rhinorrhea.  Taken orally, this medication has been linked to serious infections; higher rate of mortality; malignancy and lymphoproliferative disorders; major adverse cardiovascular events; thrombosis; thrombocytopenia, anemia, and neutropenia; and lipid elevations.
Rifampin Pregnancy And Lactation Text: This medication is Pregnancy Category C and it isn't know if it is safe during pregnancy. It is also excreted in breast milk and should not be used if you are breast feeding.
Xelnashz Pregnancy And Lactation Text: This medication is Pregnancy Category D and is not considered safe during pregnancy.  The risk during breast feeding is also uncertain.
Oral Minoxidil Pregnancy And Lactation Text: This medication should only be used when clearly needed if you are pregnant, attempting to become pregnant or breast feeding.
Topical Steroids Applications Pregnancy And Lactation Text: Most topical steroids are considered safe to use during pregnancy and lactation.  Any topical steroid applied to the breast or nipple should be washed off before breastfeeding.
Cyclosporine Counseling:  I discussed with the patient the risks of cyclosporine including but not limited to hypertension, gingival hyperplasia,myelosuppression, immunosuppression, liver damage, kidney damage, neurotoxicity, lymphoma, and serious infections. The patient understands that monitoring is required including baseline blood pressure, CBC, CMP, lipid panel and uric acid, and then 1-2 times monthly CMP and blood pressure.
Erythromycin Counseling:  I discussed with the patient the risks of erythromycin including but not limited to GI upset, allergic reaction, drug rash, diarrhea, increase in liver enzymes, and yeast infections.
Erythromycin Pregnancy And Lactation Text: This medication is Pregnancy Category B and is considered safe during pregnancy. It is also excreted in breast milk.
Litfulo Pregnancy And Lactation Text: Based on animal studies, Lifulo may cause embryo-fetal harm when administered to pregnant women.  The medication should not be used in pregnancy.  Breastfeeding is not recommended during treatment.
Thalidomide Counseling: I discussed with the patient the risks of thalidomide including but not limited to birth defects, anxiety, weakness, chest pain, dizziness, cough and severe allergy.
Imiquimod Counseling:  I discussed with the patient the risks of imiquimod including but not limited to erythema, scaling, itching, weeping, crusting, and pain.  Patient understands that the inflammatory response to imiquimod is variable from person to person and was educated regarded proper titration schedule.  If flu-like symptoms develop, patient knows to discontinue the medication and contact us.
Azelaic Acid Pregnancy And Lactation Text: This medication is considered safe during pregnancy and breast feeding.
Topical Sulfur Applications Pregnancy And Lactation Text: This medication is considered safe during pregnancy and breast feeding secondary to limited systemic absorption.
Dapsone Counseling: I discussed with the patient the risks of dapsone including but not limited to hemolytic anemia, agranulocytosis, rashes, methemoglobinemia, kidney failure, peripheral neuropathy, headaches, GI upset, and liver toxicity.  Patients who start dapsone require monitoring including baseline LFTs and weekly CBCs for the first month, then every month thereafter.  The patient verbalized understanding of the proper use and possible adverse effects of dapsone.  All of the patient's questions and concerns were addressed.
Topical Sulfur Applications Counseling: Topical Sulfur Counseling: Patient counseled that this medication may cause skin irritation or allergic reactions.  In the event of skin irritation, the patient was advised to reduce the amount of the drug applied or use it less frequently.   The patient verbalized understanding of the proper use and possible adverse effects of topical sulfur application.  All of the patient's questions and concerns were addressed.
Sarecycline Counseling: Patient advised regarding possible photosensitivity and discoloration of the teeth, skin, lips, tongue and gums.  Patient instructed to avoid sunlight, if possible.  When exposed to sunlight, patients should wear protective clothing, sunglasses, and sunscreen.  The patient was instructed to call the office immediately if the following severe adverse effects occur:  hearing changes, easy bruising/bleeding, severe headache, or vision changes.  The patient verbalized understanding of the proper use and possible adverse effects of sarecycline.  All of the patient's questions and concerns were addressed.
Xolair Pregnancy And Lactation Text: This medication is Pregnancy Category B and is considered safe during pregnancy. This medication is excreted in breast milk.
Otezla Counseling: The side effects of Otezla were discussed with the patient, including but not limited to worsening or new depression, weight loss, diarrhea, nausea, upper respiratory tract infection, and headache. Patient instructed to call the office should any adverse effect occur.  The patient verbalized understanding of the proper use and possible adverse effects of Otezla.  All the patient's questions and concerns were addressed.
Opzelura Pregnancy And Lactation Text: There is insufficient data to evaluate drug-associated risk for major birth defects, miscarriage, or other adverse maternal or fetal outcomes.  There is a pregnancy registry that monitors pregnancy outcomes in pregnant persons exposed to the medication during pregnancy.  It is unknown if this medication is excreted in breast milk.  Do not breastfeed during treatment and for about 4 weeks after the last dose.
Hyrimoz Counseling:  I discussed with the patient the risks of adalimumab including but not limited to myelosuppression, immunosuppression, autoimmune hepatitis, demyelinating diseases, lymphoma, and serious infections.  The patient understands that monitoring is required including a PPD at baseline and must alert us or the primary physician if symptoms of infection or other concerning signs are noted.
Siliq Counseling:  I discussed with the patient the risks of Siliq including but not limited to new or worsening depression, suicidal thoughts and behavior, immunosuppression, malignancy, posterior leukoencephalopathy syndrome, and serious infections.  The patient understands that monitoring is required including a PPD at baseline and must alert us or the primary physician if symptoms of infection or other concerning signs are noted. There is also a special program designed to monitor depression which is required with Siliq.
Bactrim Pregnancy And Lactation Text: This medication is Pregnancy Category D and is known to cause fetal risk.  It is also excreted in breast milk.
Griseofulvin Pregnancy And Lactation Text: This medication is Pregnancy Category X and is known to cause serious birth defects. It is unknown if this medication is excreted in breast milk but breast feeding should be avoided.
Acitretin Counseling:  I discussed with the patient the risks of acitretin including but not limited to hair loss, dry lips/skin/eyes, liver damage, hyperlipidemia, depression/suicidal ideation, photosensitivity.  Serious rare side effects can include but are not limited to pancreatitis, pseudotumor cerebri, bony changes, clot formation/stroke/heart attack.  Patient understands that alcohol is contraindicated since it can result in liver toxicity and significantly prolong the elimination of the drug by many years.
Drysol Counseling:  I discussed with the patient the risks of drysol/aluminum chloride including but not limited to skin rash, itching, irritation, burning.
Opioid Counseling: I discussed with the patient the potential side effects of opioids including but not limited to addiction, altered mental status, and depression. I stressed avoiding alcohol, benzodiazepines, muscle relaxants and sleep aids unless specifically okayed by a physician. The patient verbalized understanding of the proper use and possible adverse effects of opioids. All of the patient's questions and concerns were addressed. They were instructed to flush the remaining pills down the toilet if they did not need them for pain.
Dutasteride Female Counseling: Dutasteride Counseling:  I discussed with the patient the risks of use of dutasteride including but not limited to decreased libido and sexual dysfunction. Explained the teratogenic nature of the medication and stressed the importance of not getting pregnant during treatment. All of the patient's questions and concerns were addressed.
Topical Ketoconazole Counseling: Patient counseled that this medication may cause skin irritation or allergic reactions.  In the event of skin irritation, the patient was advised to reduce the amount of the drug applied or use it less frequently.   The patient verbalized understanding of the proper use and possible adverse effects of ketoconazole.  All of the patient's questions and concerns were addressed.
Spevigo Counseling: I discussed with the patient the risks of Spevigo including but not limited to fatigue, nasuea, vomiting, headache, pruritus, urinary tract infection, an infusion related reactions.  The patient understands that monitoring is required including screening for tuberculosis at baseline and yearly screening thereafter while continuing Spevigo therapy. They should contact us if symptoms of infection or other concerning signs are noted.
Zepbound Counseling: I reviewed the possible side effects including: thyroid tumors, kidney disease, gallbladder disease, abdominal pain, constipation, diarrhea, nausea, vomiting and pancreatitis. Do not take this medication if you have a history or family history of multiple endocrine neoplasia syndrome type 2. Side effects reviewed, pt to contact office should one occur.
Dutasteride Pregnancy And Lactation Text: This medication is absolutely contraindicated in women, especially during pregnancy and breast feeding. Feminization of male fetuses is possible if taking while pregnant.
Doxepin Counseling:  Patient advised that the medication is sedating and not to drive a car after taking this medication. Patient informed of potential adverse effects including but not limited to dry mouth, urinary retention, and blurry vision.  The patient verbalized understanding of the proper use and possible adverse effects of doxepin.  All of the patient's questions and concerns were addressed.
Winlevi Counseling:  I discussed with the patient the risks of topical clascoterone including but not limited to erythema, scaling, itching, and stinging. Patient voiced their understanding.
Niacinamide Counseling: I recommended taking niacin or niacinamide, also know as vitamin B3, twice daily. Recent evidence suggests that taking vitamin B3 (500 mg twice daily) can reduce the risk of actinic keratoses and non-melanoma skin cancers. Side effects of vitamin B3 include flushing and headache.
Dupixent Counseling: I discussed with the patient the risks of dupilumab including but not limited to eye infection and irritation, cold sores, injection site reactions, worsening of asthma, allergic reactions and increased risk of parasitic infection.  Live vaccines should be avoided while taking dupilumab. Dupilumab will also interact with certain medications such as warfarin and cyclosporine. The patient understands that monitoring is required and they must alert us or the primary physician if symptoms of infection or other concerning signs are noted.
Solaraze Pregnancy And Lactation Text: This medication is Pregnancy Category B and is considered safe. There is some data to suggest avoiding during the third trimester. It is unknown if this medication is excreted in breast milk.
Klisyri Counseling:  I discussed with the patient the risks of Klisyri including but not limited to erythema, scaling, itching, weeping, crusting, and pain.
Dupixent Pregnancy And Lactation Text: This medication likely crosses the placenta but the risk for the fetus is uncertain. This medication is excreted in breast milk.
Cephalexin Counseling: I counseled the patient regarding use of cephalexin as an antibiotic for prophylactic and/or therapeutic purposes. Cephalexin (commonly prescribed under brand name Keflex) is a cephalosporin antibiotic which is active against numerous classes of bacteria, including most skin bacteria. Side effects may include nausea, diarrhea, gastrointestinal upset, rash, hives, yeast infections, and in rare cases, hepatitis, kidney disease, seizures, fever, confusion, neurologic symptoms, and others. Patients with severe allergies to penicillin medications are cautioned that there is about a 10% incidence of cross-reactivity with cephalosporins. When possible, patients with penicillin allergies should use alternatives to cephalosporins for antibiotic therapy.
Azathioprine Counseling:  I discussed with the patient the risks of azathioprine including but not limited to myelosuppression, immunosuppression, hepatotoxicity, lymphoma, and infections.  The patient understands that monitoring is required including baseline LFTs, Creatinine, possible TPMP genotyping and weekly CBCs for the first month and then every 2 weeks thereafter.  The patient verbalized understanding of the proper use and possible adverse effects of azathioprine.  All of the patient's questions and concerns were addressed.
Itraconazole Counseling:  I discussed with the patient the risks of itraconazole including but not limited to liver damage, nausea/vomiting, neuropathy, and severe allergy.  The patient understands that this medication is best absorbed when taken with acidic beverages such as non-diet cola or ginger ale.  The patient understands that monitoring is required including baseline LFTs and repeat LFTs at intervals.  The patient understands that they are to contact us or the primary physician if concerning signs are noted.
Opioid Pregnancy And Lactation Text: These medications can lead to premature delivery and should be avoided during pregnancy. These medications are also present in breast milk in small amounts.
Adbry Counseling: I discussed with the patient the risks of tralokinumab including but not limited to eye infection and irritation, cold sores, injection site reactions, worsening of asthma, allergic reactions and increased risk of parasitic infection.  Live vaccines should be avoided while taking tralokinumab. The patient understands that monitoring is required and they must alert us or the primary physician if symptoms of infection or other concerning signs are noted.
Acitretin Pregnancy And Lactation Text: This medication is Pregnancy Category X and should not be given to women who are pregnant or may become pregnant in the future. This medication is excreted in breast milk.
Erivedge Counseling- I discussed with the patient the risks of Erivedge including but not limited to nausea, vomiting, diarrhea, constipation, weight loss, changes in the sense of taste, decreased appetite, muscle spasms, and hair loss.  The patient verbalized understanding of the proper use and possible adverse effects of Erivedge.  All of the patient's questions and concerns were addressed.
Bexarotene Counseling:  I discussed with the patient the risks of bexarotene including but not limited to hair loss, dry lips/skin/eyes, liver abnormalities, hyperlipidemia, pancreatitis, depression/suicidal ideation, photosensitivity, drug rash/allergic reactions, hypothyroidism, anemia, leukopenia, infection, cataracts, and teratogenicity.  Patient understands that they will need regular blood tests to check lipid profile, liver function tests, white blood cell count, thyroid function tests and pregnancy test if applicable.
Adbry Pregnancy And Lactation Text: It is unknown if this medication will adversely affect pregnancy or breast feeding.
Elidel Counseling: Patient may experience a mild burning sensation during topical application. Elidel is not approved in children less than 2 years of age. There have been case reports of hematologic and skin malignancies in patients using topical calcineurin inhibitors although causality is questionable.
Niacinamide Pregnancy And Lactation Text: These medications are considered safe during pregnancy.
Winlevi Pregnancy And Lactation Text: This medication is considered safe during pregnancy and breastfeeding.
Doxepin Pregnancy And Lactation Text: This medication is Pregnancy Category C and it isn't known if it is safe during pregnancy. It is also excreted in breast milk and breast feeding isn't recommended.
Soolantra Counseling: I discussed with the patients the risks of topial Soolantra. This is a medicine which decreases the number of mites and inflammation in the skin. You experience burning, stinging, eye irritation or allergic reactions.  Please call our office if you develop any problems from using this medication.
Saxenda Counseling: I reviewed the possible side effects including: thyroid tumors, kidney disease, gallbladder disease, abdominal pain, constipation, diarrhea, nausea, vomiting and pancreatitis. Do not take this medication if you have a history or family history of multiple endocrine neoplasia syndrome type 2. Side effects reviewed, pt to contact office should one occur.
Hydroxyzine Counseling: Patient advised that the medication is sedating and not to drive a car after taking this medication.  Patient informed of potential adverse effects including but not limited to dry mouth, urinary retention, and blurry vision.  The patient verbalized understanding of the proper use and possible adverse effects of hydroxyzine.  All of the patient's questions and concerns were addressed.
Nsaids Counseling: NSAID Counseling: I discussed with the patient that NSAIDs should be taken with food. Prolonged use of NSAIDs can result in the development of stomach ulcers.  Patient advised to stop taking NSAIDs if abdominal pain occurs.  The patient verbalized understanding of the proper use and possible adverse effects of NSAIDs.  All of the patient's questions and concerns were addressed.
Soolantra Pregnancy And Lactation Text: This medication is Pregnancy Category C. This medication is considered safe during breast feeding.
Klisyri Pregnancy And Lactation Text: It is unknown if this medication can harm a developing fetus or if it is excreted in breast milk.
Itraconazole Pregnancy And Lactation Text: This medication is Pregnancy Category C and it isn't know if it is safe during pregnancy. It is also excreted in breast milk.
Ebglyss Counseling: I discussed with the patient the risks of lebrikizumab including but not limited to eye inflammation and irritation, cold sores, injection site reactions, allergic reactions and increased risk of parasitic infection. The patient understands that monitoring is required and they must alert us or the primary physician if symptoms of infection or other concerning signs are noted.
Topical Metronidazole Counseling: Metronidazole is a topical antibiotic medication. You may experience burning, stinging, redness, or allergic reactions.  Please call our office if you develop any problems from using this medication.
Stelara Counseling:  I discussed with the patient the risks of ustekinumab including but not limited to immunosuppression, malignancy, posterior leukoencephalopathy syndrome, and serious infections.  The patient understands that monitoring is required including a PPD at baseline and must alert us or the primary physician if symptoms of infection or other concerning signs are noted.
Cephalexin Pregnancy And Lactation Text: This medication is Pregnancy Category B and considered safe during pregnancy.  It is also excreted in breast milk but can be used safely for shorter doses.
Spevigo Pregnancy And Lactation Text: The risk during pregnancy and breastfeeding is uncertain with this medication. This medication does cross the placenta. It is unknown if this medication is found in breast milk.
Libtayo Counseling- I discussed with the patient the risks of Libtayo including but not limited to nausea, vomiting, diarrhea, and bone or muscle pain.  The patient verbalized understanding of the proper use and possible adverse effects of Libtayo.  All of the patient's questions and concerns were addressed.
Clindamycin Counseling: I counseled the patient regarding use of clindamycin as an antibiotic for prophylactic and/or therapeutic purposes. Clindamycin is active against numerous classes of bacteria, including skin bacteria. Side effects may include nausea, diarrhea, gastrointestinal upset, rash, hives, yeast infections, and in rare cases, colitis.
Bexarotene Pregnancy And Lactation Text: This medication is Pregnancy Category X and should not be given to women who are pregnant or may become pregnant. This medication should not be used if you are breast feeding.
Arava Counseling:  Patient counseled regarding adverse effects of Arava including but not limited to nausea, vomiting, abnormalities in liver function tests. Patients may develop mouth sores, rash, diarrhea, and abnormalities in blood counts. The patient understands that monitoring is required including LFTs and blood counts.  There is a rare possibility of scarring of the liver and lung problems that can occur when taking methotrexate. Persistent nausea, loss of appetite, pale stools, dark urine, cough, and shortness of breath should be reported immediately. Patient advised to discontinue Arava treatment and consult with a physician prior to attempting conception. The patient will have to undergo a treatment to eliminate Arava from the body prior to conception.
Bimzelx Counseling:  I discussed with the patient the risks of Bimzelx including but not limited to depression, immunosuppression, allergic reactions and infections.  The patient understands that monitoring is required including a PPD at baseline and must alert us or the primary physician if symptoms of infection or other concerning signs are noted.
VTAMA Counseling: I discussed with the patient that VTAMA is not for use in the eyes, mouth or mouth. They should call the office if they develop any signs of allergic reactions to VTAMA. The patient verbalized understanding of the proper use and possible adverse effects of VTAMA.  All of the patient's questions and concerns were addressed.
Nsaids Pregnancy And Lactation Text: These medications are considered safe up to 30 weeks gestation. It is excreted in breast milk.
Semaglutide Counseling: I reviewed the possible side effects including: thyroid tumors, kidney disease, gallbladder disease, abdominal pain, constipation, diarrhea, nausea, vomiting and pancreatitis. Do not take this medication if you have a history or family history of multiple endocrine neoplasia syndrome type 2. Side effects reviewed, pt to contact office should one occur.
Minoxidil Counseling: Minoxidil is a topical medication which can increase blood flow where it is applied. It is uncertain how this medication increases hair growth. Side effects are uncommon and include stinging and allergic reactions.
Topical Metronidazole Pregnancy And Lactation Text: This medication is Pregnancy Category B and considered safe during pregnancy.  It is also considered safe to use while breastfeeding.
Hydroxyzine Pregnancy And Lactation Text: This medication is not safe during pregnancy and should not be taken. It is also excreted in breast milk and breast feeding isn't recommended.
Topical Retinoid counseling:  Patient advised to apply a pea-sized amount only at bedtime and wait 30 minutes after washing their face before applying.  If too drying, patient may add a non-comedogenic moisturizer. The patient verbalized understanding of the proper use and possible adverse effects of retinoids.  All of the patient's questions and concerns were addressed.
Ebglyss Pregnancy And Lactation Text: This medication likely crosses the placenta but the risk for the fetus is uncertain. It is unknown if this medication is excreted in breast milk.
Cellcept Counseling:  I discussed with the patient the risks of mycophenolate mofetil including but not limited to infection/immunosuppression, GI upset, hypokalemia, hypercholesterolemia, bone marrow suppression, lymphoproliferative disorders, malignancy, GI ulceration/bleed/perforation, colitis, interstitial lung disease, kidney failure, progressive multifocal leukoencephalopathy, and birth defects.  The patient understands that monitoring is required including a baseline creatinine and regular CBC testing. In addition, patient must alert us immediately if symptoms of infection or other concerning signs are noted.
Ketoconazole Counseling:   Patient counseled regarding improving absorption with orange juice.  Adverse effects include but are not limited to breast enlargement, headache, diarrhea, nausea, upset stomach, liver function test abnormalities, taste disturbance, and stomach pain.  There is a rare possibility of liver failure that can occur when taking ketoconazole. The patient understands that monitoring of LFTs may be required, especially at baseline. The patient verbalized understanding of the proper use and possible adverse effects of ketoconazole.  All of the patient's questions and concerns were addressed.
Ketoconazole Pregnancy And Lactation Text: This medication is Pregnancy Category C and it isn't know if it is safe during pregnancy. It is also excreted in breast milk and breast feeding isn't recommended.
Clindamycin Pregnancy And Lactation Text: This medication can be used in pregnancy if certain situations. Clindamycin is also present in breast milk.
Libtayo Pregnancy And Lactation Text: This medication is contraindicated in pregnancy and when breast feeding.
Bimzelx Pregnancy And Lactation Text: This medication crosses the placenta and the safety is uncertain during pregnancy. It is unknown if this medication is present in breast milk.
Eucrisa Counseling: Patient may experience a mild burning sensation during topical application. Eucrisa is not approved in children less than 3 months of age.
Isotretinoin Counseling: Patient should get monthly blood tests, not donate blood, not drive at night if vision affected, not share medication, and not undergo elective surgery for 6 months after tx completed. Side effects reviewed, pt to contact office should one occur.
Calcipotriene Pregnancy And Lactation Text: The use of this medication during pregnancy or lactation is not recommended as there is insufficient data.
Glycopyrrolate Pregnancy And Lactation Text: This medication is Pregnancy Category B and is considered safe during pregnancy. It is unknown if it is excreted breast milk.
Simponi Counseling:  I discussed with the patient the risks of golimumab including but not limited to myelosuppression, immunosuppression, autoimmune hepatitis, demyelinating diseases, lymphoma, and serious infections.  The patient understands that monitoring is required including a PPD at baseline and must alert us or the primary physician if symptoms of infection or other concerning signs are noted.
Qbrexza Counseling:  I discussed with the patient the risks of Qbrexza including but not limited to headache, mydriasis, blurred vision, dry eyes, nasal dryness, dry mouth, dry throat, dry skin, urinary hesitation, and constipation.  Local skin reactions including erythema, burning, stinging, and itching can also occur.
Propranolol Pregnancy And Lactation Text: This medication is Pregnancy Category C and it isn't known if it is safe during pregnancy. It is excreted in breast milk.
Topical Steroids Counseling: I discussed with the patient that prolonged use of topical steroids can result in the increased appearance of superficial blood vessels (telangiectasias), lightening (hypopigmentation) and thinning of the skin (atrophy).  Patient understands to avoid using high potency steroids in skin folds, the groin or the face.  The patient verbalized understanding of the proper use and possible adverse effects of topical steroids.  All of the patient's questions and concerns were addressed.
SSKI Counseling:  I discussed with the patient the risks of SSKI including but not limited to thyroid abnormalities, metallic taste, GI upset, fever, headache, acne, arthralgias, paraesthesias, lymphadenopathy, easy bleeding, arrhythmias, and allergic reaction.
Albendazole Counseling:  I discussed with the patient the risks of albendazole including but not limited to cytopenia, kidney damage, nausea/vomiting and severe allergy.  The patient understands that this medication is being used in an off-label manner.
Nemluvio Counseling: I discussed with the patient the risks of nemolizumab including but not limited to headache, gastrointestinal complaints, nasopharyngitis, musculoskeletal complaints, injection site reactions, and allergic reactions. The patient understands that monitoring is required and they must alert us or the primary physician if any side effects are noted.
Tazorac Counseling:  Patient advised that medication is irritating and drying.  Patient may need to apply sparingly and wash off after an hour before eventually leaving it on overnight.  The patient verbalized understanding of the proper use and possible adverse effects of tazorac.  All of the patient's questions and concerns were addressed.
Hydroxychloroquine Counseling:  I discussed with the patient that a baseline ophthalmologic exam is needed at the start of therapy and every year thereafter while on therapy. A CBC may also be warranted for monitoring.  The side effects of this medication were discussed with the patient, including but not limited to agranulocytosis, aplastic anemia, seizures, rashes, retinopathy, and liver toxicity. Patient instructed to call the office should any adverse effect occur.  The patient verbalized understanding of the proper use and possible adverse effects of Plaquenil.  All the patient's questions and concerns were addressed.
Birth Control Pills Counseling: Birth Control Pill Counseling: I discussed with the patient the potential side effects of OCPs including but not limited to increased risk of stroke, heart attack, thrombophlebitis, deep venous thrombosis, hepatic adenomas, breast changes, GI upset, headaches, and depression.  The patient verbalized understanding of the proper use and possible adverse effects of OCPs. All of the patient's questions and concerns were addressed.
Qbrexza Pregnancy And Lactation Text: There is no available data on Qbrexza use in pregnant women.  There is no available data on Qbrexza use in lactation.
Rhofade Counseling: Rhofade is a topical medication which can decrease superficial blood flow where applied. Side effects are uncommon and include stinging, redness and allergic reactions.
Cimzia Pregnancy And Lactation Text: This medication crosses the placenta but can be considered safe in certain situations. Cimzia may be excreted in breast milk.
Birth Control Pills Pregnancy And Lactation Text: This medication should be avoided if pregnant and for the first 30 days post-partum.
Fluconazole Counseling:  Patient counseled regarding adverse effects of fluconazole including but not limited to headache, diarrhea, nausea, upset stomach, liver function test abnormalities, taste disturbance, and stomach pain.  There is a rare possibility of liver failure that can occur when taking fluconazole.  The patient understands that monitoring of LFTs and kidney function test may be required, especially at baseline. The patient verbalized understanding of the proper use and possible adverse effects of fluconazole.  All of the patient's questions and concerns were addressed.
Nemluvio Pregnancy And Lactation Text: It is not known if Nemluvio causes fetal harm or is present in breast milk. Please proceed with caution if patients who are pregnant or breastfeeding.
Azithromycin Counseling:  I discussed with the patient the risks of azithromycin including but not limited to GI upset, allergic reaction, drug rash, diarrhea, and yeast infections.
Albendazole Pregnancy And Lactation Text: This medication is Pregnancy Category C and it isn't known if it is safe during pregnancy. It is also excreted in breast milk.
Cantharidin Counseling:  I discussed with the patient the risks of Cantharidin including but not limited to pain, redness, burning, itching, and blistering.
Xolair Counseling:  Patient informed of potential adverse effects including but not limited to fever, muscle aches, rash and allergic reactions.  The patient verbalized understanding of the proper use and possible adverse effects of Xolair.  All of the patient's questions and concerns were addressed.
Ivermectin Counseling:  Patient instructed to take medication on an empty stomach with a full glass of water.  Patient informed of potential adverse effects including but not limited to nausea, diarrhea, dizziness, itching, and swelling of the extremities or lymph nodes.  The patient verbalized understanding of the proper use and possible adverse effects of ivermectin.  All of the patient's questions and concerns were addressed.
5-Fu Counseling: 5-Fluorouracil Counseling:  I discussed with the patient the risks of 5-fluorouracil including but not limited to erythema, scaling, itching, weeping, crusting, and pain.
Tazorac Pregnancy And Lactation Text: This medication is not safe during pregnancy. It is unknown if this medication is excreted in breast milk.
Hydroxychloroquine Pregnancy And Lactation Text: This medication has been shown to cause fetal harm but it isn't assigned a Pregnancy Risk Category. There are small amounts excreted in breast milk.
Sski Pregnancy And Lactation Text: This medication is Pregnancy Category D and isn't considered safe during pregnancy. It is excreted in breast milk.
Cimetidine Counseling:  I discussed with the patient the risks of Cimetidine including but not limited to gynecomastia, headache, diarrhea, nausea, drowsiness, arrhythmias, pancreatitis, skin rashes, psychosis, bone marrow suppression and kidney toxicity.
Low Dose Naltrexone Counseling- I discussed with the patient the potential risks and side effects of low dose naltrexone including but not limited to: more vivid dreams, headaches, nausea, vomiting, abdominal pain, fatigue, dizziness, and anxiety.
Spironolactone Counseling: Patient advised regarding risks of diarrhea, abdominal pain, hyperkalemia, birth defects (for female patients), liver toxicity and renal toxicity. The patient may need blood work to monitor liver and kidney function and potassium levels while on therapy. The patient verbalized understanding of the proper use and possible adverse effects of spironolactone.  All of the patient's questions and concerns were addressed.
Cosentyx Counseling:  I discussed with the patient the risks of Cosentyx including but not limited to worsening of Crohn's disease, immunosuppression, allergic reactions and infections.  The patient understands that monitoring is required including a PPD at baseline and must alert us or the primary physician if symptoms of infection or other concerning signs are noted.
Azithromycin Pregnancy And Lactation Text: This medication is considered safe during pregnancy and is also secreted in breast milk.
Rituxan Counseling:  I discussed with the patient the risks of Rituxan infusions. Side effects can include infusion reactions, severe drug rashes including mucocutaneous reactions, reactivation of latent hepatitis and other infections and rarely progressive multifocal leukoencephalopathy.  All of the patient's questions and concerns were addressed.
Rituxan Pregnancy And Lactation Text: This medication is Pregnancy Category C and it isn't know if it is safe during pregnancy. It is unknown if this medication is excreted in breast milk but similar antibodies are known to be excreted.
Dutasteride Male Counseling: Dustasteride Counseling:  I discussed with the patient the risks of use of dutasteride including but not limited to decreased libido, decreased ejaculate volume, and gynecomastia. Women who can become pregnant should not handle medication.  All of the patient's questions and concerns were addressed.
Topical Clindamycin Counseling: Patient counseled that this medication may cause skin irritation or allergic reactions.  In the event of skin irritation, the patient was advised to reduce the amount of the drug applied or use it less frequently.   The patient verbalized understanding of the proper use and possible adverse effects of clindamycin.  All of the patient's questions and concerns were addressed.
Skyrizi Counseling: I discussed with the patient the risks of risankizumab-rzaa including but not limited to immunosuppression, and serious infections.  The patient understands that monitoring is required including a PPD at baseline and must alert us or the primary physician if symptoms of infection or other concerning signs are noted.
Low Dose Naltrexone Pregnancy And Lactation Text: Naltrexone is pregnancy category C.  There have been no adequate and well-controlled studies in pregnant women.  It should be used in pregnancy only if the potential benefit justifies the potential risk to the fetus.   Limited data indicates that naltrexone is minimally excreted into breastmilk.
Solaraze Counseling:  I discussed with the patient the risks of Solaraze including but not limited to erythema, scaling, itching, weeping, crusting, and pain.
Ozempic Counseling: I reviewed the possible side effects including: thyroid tumors, kidney disease, gallbladder disease, abdominal pain, constipation, diarrhea, nausea, vomiting and pancreatitis. Do not take this medication if you have a history or family history of multiple endocrine neoplasia syndrome type 2. Side effects reviewed, pt to contact office should one occur.
Bactrim Counseling:  I discussed with the patient the risks of sulfa antibiotics including but not limited to GI upset, allergic reaction, drug rash, diarrhea, dizziness, photosensitivity, and yeast infections.  Rarely, more serious reactions can occur including but not limited to aplastic anemia, agranulocytosis, methemoglobinemia, blood dyscrasias, liver or kidney failure, lung infiltrates or desquamative/blistering drug rashes.
Griseofulvin Counseling:  I discussed with the patient the risks of griseofulvin including but not limited to photosensitivity, cytopenia, liver damage, nausea/vomiting and severe allergy.  The patient understands that this medication is best absorbed when taken with a fatty meal (e.g., ice cream or french fries).

## 2025-04-22 ENCOUNTER — OFFICE VISIT (OUTPATIENT)
Dept: URGENT CARE | Facility: CLINIC | Age: 45
End: 2025-04-22
Payer: COMMERCIAL

## 2025-04-22 VITALS
TEMPERATURE: 98.3 F | HEART RATE: 110 BPM | BODY MASS INDEX: 35.9 KG/M2 | WEIGHT: 223.4 LBS | OXYGEN SATURATION: 96 % | DIASTOLIC BLOOD PRESSURE: 84 MMHG | HEIGHT: 66 IN | SYSTOLIC BLOOD PRESSURE: 146 MMHG | RESPIRATION RATE: 18 BRPM

## 2025-04-22 DIAGNOSIS — J01.90 ACUTE BACTERIAL SINUSITIS: ICD-10-CM

## 2025-04-22 DIAGNOSIS — J98.8 RTI (RESPIRATORY TRACT INFECTION): ICD-10-CM

## 2025-04-22 DIAGNOSIS — B96.89 ACUTE BACTERIAL SINUSITIS: ICD-10-CM

## 2025-04-22 PROCEDURE — 3077F SYST BP >= 140 MM HG: CPT | Performed by: PHYSICIAN ASSISTANT

## 2025-04-22 PROCEDURE — 3079F DIAST BP 80-89 MM HG: CPT | Performed by: PHYSICIAN ASSISTANT

## 2025-04-22 PROCEDURE — 99204 OFFICE O/P NEW MOD 45 MIN: CPT | Performed by: PHYSICIAN ASSISTANT

## 2025-04-22 ASSESSMENT — ENCOUNTER SYMPTOMS
FEVER: 0
CHILLS: 0
COUGH: 1
SPUTUM PRODUCTION: 1
SINUS PAIN: 1

## 2025-04-22 NOTE — PROGRESS NOTES
Subjective:   Karin Briseno is a 44 y.o. female who presents today with   Chief Complaint   Patient presents with    Cough     SOB, wheezing, congestion, L clogged ear, x10 days.       Cough  This is a new problem. Episode onset: 10 days. The problem occurs every few minutes. The cough is Productive of sputum. Associated symptoms include nasal congestion. Pertinent negatives include no chills or fever. Treatments tried: Dayquil/Nyquil. The treatment provided mild relief.     Patient also states she is having some discomfort through her right foot near the second digit and recently got back from a trip where she was doing a lot of walking.  She states no specific injury or trauma to the area.    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    She has no past medical history of Asthma or Diabetes ([facility]).  MEDS:   Current Outpatient Medications:     amoxicillin-clavulanate (AUGMENTIN) 875-125 MG Tab, Take 1 Tablet by mouth 2 times a day for 7 days., Disp: 14 Tablet, Rfl: 0    lisinopril (PRINIVIL) 5 MG Tab, lisinopril 5 mg tablet  TAKE 1 TABLET BY MOUTH ONCE DAILY, Disp: , Rfl:     metFORMIN ER (GLUCOPHAGE XR) 500 MG TABLET SR 24 HR, , Disp: , Rfl:     NIFEdipine (ADALAT CC) 30 MG CR tablet, Take 1 tablet by mouth every day., Disp: 30 tablet, Rfl: 1    vitamin D (CHOLECALCIFEROL) 1000 UNIT Tab, Take 2,000 Units by mouth every day., Disp: , Rfl:     lisinopril (PRINIVIL) 10 MG Tab, , Disp: , Rfl:     meloxicam (MOBIC) 15 MG tablet, Take 1 Tablet by mouth every day., Disp: 30 Tablet, Rfl: 0    Prenatal MV-Min-Fe Fum-FA-DHA (PRENATAL 1 PO), Take  by mouth., Disp: , Rfl:   ALLERGIES:   Allergies   Allergen Reactions    Codeine Vomiting     SURGHX: History reviewed. No pertinent surgical history.  SOCHX:  reports  "that she has never smoked. She has never used smokeless tobacco. She reports current alcohol use. She reports that she does not use drugs.  FH: Reviewed with patient, not pertinent to this visit.       Review of Systems   Constitutional:  Negative for chills and fever.   HENT:  Positive for congestion and sinus pain.    Respiratory:  Positive for cough and sputum production.    Musculoskeletal:         Right foot pain        Objective:   BP (!) 146/84   Pulse (!) 110   Temp 36.8 °C (98.3 °F) (Temporal)   Resp 18   Ht 1.676 m (5' 6\")   Wt 101 kg (223 lb 6.4 oz)   SpO2 96%   Breastfeeding No   BMI 36.06 kg/m²   Physical Exam  Vitals and nursing note reviewed.   Constitutional:       General: She is not in acute distress.     Appearance: Normal appearance. She is well-developed. She is not ill-appearing or toxic-appearing.   HENT:      Head: Normocephalic and atraumatic.      Right Ear: Hearing, tympanic membrane and ear canal normal.      Left Ear: Hearing and ear canal normal. A middle ear effusion is present. Tympanic membrane is not erythematous.      Nose: Mucosal edema and congestion present.      Mouth/Throat:      Mouth: Mucous membranes are moist.      Pharynx: No oropharyngeal exudate or posterior oropharyngeal erythema.   Cardiovascular:      Rate and Rhythm: Normal rate and regular rhythm.      Heart sounds: Normal heart sounds.   Pulmonary:      Effort: Pulmonary effort is normal. No respiratory distress.      Breath sounds: Normal breath sounds. No stridor. No wheezing, rhonchi or rales.      Comments: Congested cough on exam  Musculoskeletal:        Feet:       Comments: Mild tenderness to palpation just proximal to the second digit of the right foot.  No bruising or swelling noted to the area.  Patient does have slight decreased range of motion secondary to discomfort.  Neurovascular intact distally.  No bony tenderness to palpation to the area.   Skin:     General: Skin is warm and dry. "   Neurological:      Mental Status: She is alert.      Coordination: Coordination normal.   Psychiatric:         Mood and Affect: Mood normal.         Assessment/Plan:   Assessment    1. Acute bacterial sinusitis  - amoxicillin-clavulanate (AUGMENTIN) 875-125 MG Tab; Take 1 Tablet by mouth 2 times a day for 7 days.  Dispense: 14 Tablet; Refill: 0    2. RTI (respiratory tract infection)  - amoxicillin-clavulanate (AUGMENTIN) 875-125 MG Tab; Take 1 Tablet by mouth 2 times a day for 7 days.  Dispense: 14 Tablet; Refill: 0    Symptoms and presentation appear consistent with respiratory tract infection as well as sinus infection we will treat accordingly with antibiotics and recommend use of over-the-counter remedies such as Mucinex without decongestant given her blood pressure.  Would also suggest Flonase and sinus rinse.  Regarding the foot appears more consistent with soft tissue strain.  No indication for x-ray on exam and patient agrees with this.  Would suggest following up with primary care if symptoms persist despite over-the-counter treatment with rest, ice and elevation and patient agrees with plan.  Patient with no symptoms regarding blood pressure recommend continue to closely monitor blood pressure.    Differential diagnosis, natural history, supportive care, and indications for immediate follow-up discussed.   Patient given instructions and understanding of medications and treatment.    If not improving in 3-5 days, F/U with PCP or return to  if symptoms worsen.    Patient agreeable to plan.      Please note that this dictation was created using voice recognition software. I have made every reasonable attempt to correct obvious errors, but I expect that there are errors of grammar and possibly content that I did not discover before finalizing the note.    Daniel Ramirez PA-C

## 2025-08-26 ENCOUNTER — OFFICE VISIT (OUTPATIENT)
Dept: URGENT CARE | Facility: CLINIC | Age: 45
End: 2025-08-26
Payer: COMMERCIAL

## 2025-08-26 VITALS
SYSTOLIC BLOOD PRESSURE: 134 MMHG | DIASTOLIC BLOOD PRESSURE: 80 MMHG | OXYGEN SATURATION: 97 % | BODY MASS INDEX: 35.84 KG/M2 | HEIGHT: 66 IN | HEART RATE: 121 BPM | TEMPERATURE: 99.2 F | RESPIRATION RATE: 19 BRPM | WEIGHT: 223 LBS

## 2025-08-26 DIAGNOSIS — R50.9 FEVER, UNSPECIFIED FEVER CAUSE: ICD-10-CM

## 2025-08-26 DIAGNOSIS — J02.9 EXUDATIVE PHARYNGITIS: ICD-10-CM

## 2025-08-26 DIAGNOSIS — M79.10 MYALGIA: ICD-10-CM

## 2025-08-26 DIAGNOSIS — J02.9 SORE THROAT: Primary | ICD-10-CM

## 2025-08-26 LAB
FLUAV RNA SPEC QL NAA+PROBE: NEGATIVE
FLUBV RNA SPEC QL NAA+PROBE: NEGATIVE
RSV RNA SPEC QL NAA+PROBE: NEGATIVE
S PYO DNA SPEC NAA+PROBE: NOT DETECTED
SARS-COV-2 RNA RESP QL NAA+PROBE: NEGATIVE

## 2025-08-26 RX ORDER — AMOXICILLIN 500 MG/1
500 CAPSULE ORAL 2 TIMES DAILY
Qty: 20 CAPSULE | Refills: 0 | Status: SHIPPED | OUTPATIENT
Start: 2025-08-26 | End: 2025-09-05

## 2025-08-26 RX ORDER — DEXAMETHASONE SODIUM PHOSPHATE 10 MG/ML
10 INJECTION, SOLUTION INTRA-ARTICULAR; INTRALESIONAL; INTRAMUSCULAR; INTRAVENOUS; SOFT TISSUE ONCE
Status: COMPLETED | OUTPATIENT
Start: 2025-08-26 | End: 2025-08-26

## 2025-08-26 RX ADMIN — DEXAMETHASONE SODIUM PHOSPHATE 10 MG: 10 INJECTION, SOLUTION INTRA-ARTICULAR; INTRALESIONAL; INTRAMUSCULAR; INTRAVENOUS; SOFT TISSUE at 13:02

## 2025-08-26 ASSESSMENT — LIFESTYLE VARIABLES
SKIP TO QUESTIONS 9-10: 1
HOW OFTEN DO YOU HAVE SIX OR MORE DRINKS ON ONE OCCASION: NEVER
AUDIT-C TOTAL SCORE: 0
HOW MANY STANDARD DRINKS CONTAINING ALCOHOL DO YOU HAVE ON A TYPICAL DAY: PATIENT DOES NOT DRINK
HOW OFTEN DO YOU HAVE A DRINK CONTAINING ALCOHOL: NEVER